# Patient Record
Sex: MALE | Race: WHITE | NOT HISPANIC OR LATINO | URBAN - METROPOLITAN AREA
[De-identification: names, ages, dates, MRNs, and addresses within clinical notes are randomized per-mention and may not be internally consistent; named-entity substitution may affect disease eponyms.]

---

## 2017-04-20 ENCOUNTER — OFFICE (AMBULATORY)
Dept: URBAN - METROPOLITAN AREA CLINIC 64 | Facility: CLINIC | Age: 57
End: 2017-04-20

## 2017-04-20 ENCOUNTER — ON CAMPUS - OUTPATIENT (AMBULATORY)
Dept: URBAN - METROPOLITAN AREA HOSPITAL 2 | Facility: HOSPITAL | Age: 57
End: 2017-04-20
Payer: COMMERCIAL

## 2017-04-20 VITALS
SYSTOLIC BLOOD PRESSURE: 137 MMHG | SYSTOLIC BLOOD PRESSURE: 138 MMHG | DIASTOLIC BLOOD PRESSURE: 101 MMHG | DIASTOLIC BLOOD PRESSURE: 95 MMHG | SYSTOLIC BLOOD PRESSURE: 150 MMHG | SYSTOLIC BLOOD PRESSURE: 162 MMHG | OXYGEN SATURATION: 97 % | DIASTOLIC BLOOD PRESSURE: 83 MMHG | RESPIRATION RATE: 18 BRPM | DIASTOLIC BLOOD PRESSURE: 89 MMHG | HEIGHT: 71 IN | DIASTOLIC BLOOD PRESSURE: 67 MMHG | HEART RATE: 74 BPM | HEART RATE: 80 BPM | OXYGEN SATURATION: 100 % | HEART RATE: 84 BPM | DIASTOLIC BLOOD PRESSURE: 107 MMHG | OXYGEN SATURATION: 96 % | WEIGHT: 190 LBS | HEART RATE: 69 BPM | HEART RATE: 73 BPM | SYSTOLIC BLOOD PRESSURE: 136 MMHG | OXYGEN SATURATION: 98 % | SYSTOLIC BLOOD PRESSURE: 142 MMHG | SYSTOLIC BLOOD PRESSURE: 134 MMHG | DIASTOLIC BLOOD PRESSURE: 78 MMHG | TEMPERATURE: 97.7 F | OXYGEN SATURATION: 99 % | DIASTOLIC BLOOD PRESSURE: 92 MMHG | RESPIRATION RATE: 16 BRPM

## 2017-04-20 DIAGNOSIS — K64.0 FIRST DEGREE HEMORRHOIDS: ICD-10-CM

## 2017-04-20 DIAGNOSIS — Z86.010 PERSONAL HISTORY OF COLONIC POLYPS: ICD-10-CM

## 2017-04-20 DIAGNOSIS — D12.0 BENIGN NEOPLASM OF CECUM: ICD-10-CM

## 2017-04-20 DIAGNOSIS — D12.2 BENIGN NEOPLASM OF ASCENDING COLON: ICD-10-CM

## 2017-04-20 DIAGNOSIS — K64.4 RESIDUAL HEMORRHOIDAL SKIN TAGS: ICD-10-CM

## 2017-04-20 LAB
GI HISTOLOGY: A. SELECT: (no result)
GI HISTOLOGY: PDF REPORT: (no result)

## 2017-04-20 PROCEDURE — 88305 TISSUE EXAM BY PATHOLOGIST: CPT | Performed by: INTERNAL MEDICINE

## 2017-04-20 PROCEDURE — 45385 COLONOSCOPY W/LESION REMOVAL: CPT | Mod: 33 | Performed by: INTERNAL MEDICINE

## 2017-04-20 RX ADMIN — PROPOFOL: 10 INJECTION, EMULSION INTRAVENOUS at 08:14

## 2019-04-14 ENCOUNTER — HOSPITAL ENCOUNTER (INPATIENT)
Facility: HOSPITAL | Age: 59
LOS: 3 days | Discharge: HOME | End: 2019-04-17
Attending: PSYCHIATRY & NEUROLOGY | Admitting: INTERNAL MEDICINE

## 2019-04-14 ENCOUNTER — APPOINTMENT (OUTPATIENT)
Dept: CT IMAGING | Facility: HOSPITAL | Age: 59
End: 2019-04-14

## 2019-04-14 DIAGNOSIS — G47.33 OBSTRUCTIVE SLEEP APNEA SYNDROME: Primary | ICD-10-CM

## 2019-04-14 LAB
APTT PPP: 29 SECONDS (ref 22.7–35.4)
BASOPHILS # BLD AUTO: 0.04 10*3/MM3 (ref 0–0.2)
BASOPHILS NFR BLD AUTO: 0.5 % (ref 0–1.5)
DEPRECATED RDW RBC AUTO: 42.5 FL (ref 37–54)
EOSINOPHIL # BLD AUTO: 0.01 10*3/MM3 (ref 0–0.4)
EOSINOPHIL NFR BLD AUTO: 0.1 % (ref 0.3–6.2)
ERYTHROCYTE [DISTWIDTH] IN BLOOD BY AUTOMATED COUNT: 12.4 % (ref 12.3–15.4)
GLUCOSE BLDC GLUCOMTR-MCNC: 103 MG/DL (ref 70–130)
GLUCOSE BLDC GLUCOMTR-MCNC: 97 MG/DL (ref 70–130)
HCT VFR BLD AUTO: 42.8 % (ref 37.5–51)
HGB BLD-MCNC: 14.7 G/DL (ref 13–17.7)
IMM GRANULOCYTES # BLD AUTO: 0.04 10*3/MM3 (ref 0–0.05)
IMM GRANULOCYTES NFR BLD AUTO: 0.5 % (ref 0–0.5)
INR PPP: 1.12 (ref 0.9–1.1)
LYMPHOCYTES # BLD AUTO: 1.12 10*3/MM3 (ref 0.7–3.1)
LYMPHOCYTES NFR BLD AUTO: 15.1 % (ref 19.6–45.3)
MCH RBC QN AUTO: 32 PG (ref 26.6–33)
MCHC RBC AUTO-ENTMCNC: 34.3 G/DL (ref 31.5–35.7)
MCV RBC AUTO: 93.2 FL (ref 79–97)
MONOCYTES # BLD AUTO: 0.35 10*3/MM3 (ref 0.1–0.9)
MONOCYTES NFR BLD AUTO: 4.7 % (ref 5–12)
NEUTROPHILS # BLD AUTO: 5.86 10*3/MM3 (ref 1.4–7)
NEUTROPHILS NFR BLD AUTO: 79.1 % (ref 42.7–76)
NRBC BLD AUTO-RTO: 0 /100 WBC (ref 0–0)
PLATELET # BLD AUTO: 251 10*3/MM3 (ref 140–450)
PMV BLD AUTO: 9.6 FL (ref 6–12)
PROTHROMBIN TIME: 14.1 SECONDS (ref 11.7–14.2)
RBC # BLD AUTO: 4.59 10*6/MM3 (ref 4.14–5.8)
WBC NRBC COR # BLD: 7.42 10*3/MM3 (ref 3.4–10.8)

## 2019-04-14 PROCEDURE — 82962 GLUCOSE BLOOD TEST: CPT

## 2019-04-14 PROCEDURE — 70498 CT ANGIOGRAPHY NECK: CPT

## 2019-04-14 PROCEDURE — 85610 PROTHROMBIN TIME: CPT | Performed by: PSYCHIATRY & NEUROLOGY

## 2019-04-14 PROCEDURE — 85730 THROMBOPLASTIN TIME PARTIAL: CPT | Performed by: PSYCHIATRY & NEUROLOGY

## 2019-04-14 PROCEDURE — 0 IOPAMIDOL PER 1 ML: Performed by: PSYCHIATRY & NEUROLOGY

## 2019-04-14 PROCEDURE — 85025 COMPLETE CBC W/AUTO DIFF WBC: CPT | Performed by: PSYCHIATRY & NEUROLOGY

## 2019-04-14 PROCEDURE — 25010000002 HEPARIN (PORCINE) PER 1000 UNITS: Performed by: PSYCHIATRY & NEUROLOGY

## 2019-04-14 PROCEDURE — 0042T HC CT CEREBRAL PERFUSION W/WO CONTRAST: CPT

## 2019-04-14 PROCEDURE — 70496 CT ANGIOGRAPHY HEAD: CPT

## 2019-04-14 PROCEDURE — 70450 CT HEAD/BRAIN W/O DYE: CPT

## 2019-04-14 PROCEDURE — 99255 IP/OBS CONSLTJ NEW/EST HI 80: CPT | Performed by: PSYCHIATRY & NEUROLOGY

## 2019-04-14 RX ORDER — SODIUM CHLORIDE 0.9 % (FLUSH) 0.9 %
3 SYRINGE (ML) INJECTION EVERY 12 HOURS SCHEDULED
Status: DISCONTINUED | OUTPATIENT
Start: 2019-04-14 | End: 2019-04-17 | Stop reason: HOSPADM

## 2019-04-14 RX ORDER — ASPIRIN 325 MG
325 TABLET ORAL DAILY
Status: DISCONTINUED | OUTPATIENT
Start: 2019-04-15 | End: 2019-04-14

## 2019-04-14 RX ORDER — ASPIRIN 300 MG/1
300 SUPPOSITORY RECTAL DAILY
Status: DISCONTINUED | OUTPATIENT
Start: 2019-04-15 | End: 2019-04-14

## 2019-04-14 RX ORDER — SODIUM CHLORIDE 9 MG/ML
75 INJECTION, SOLUTION INTRAVENOUS CONTINUOUS
Status: DISCONTINUED | OUTPATIENT
Start: 2019-04-14 | End: 2019-04-17

## 2019-04-14 RX ORDER — ASPIRIN 81 MG/1
81 TABLET, CHEWABLE ORAL DAILY
Status: DISCONTINUED | OUTPATIENT
Start: 2019-04-15 | End: 2019-04-17 | Stop reason: HOSPADM

## 2019-04-14 RX ORDER — ATORVASTATIN CALCIUM 80 MG/1
80 TABLET, FILM COATED ORAL NIGHTLY
Status: DISCONTINUED | OUTPATIENT
Start: 2019-04-14 | End: 2019-04-17 | Stop reason: HOSPADM

## 2019-04-14 RX ORDER — HEPARIN SODIUM 10000 [USP'U]/100ML
12 INJECTION, SOLUTION INTRAVENOUS
Status: DISCONTINUED | OUTPATIENT
Start: 2019-04-14 | End: 2019-04-17

## 2019-04-14 RX ORDER — ASPIRIN 300 MG/1
300 SUPPOSITORY RECTAL DAILY
Status: DISCONTINUED | OUTPATIENT
Start: 2019-04-15 | End: 2019-04-17 | Stop reason: HOSPADM

## 2019-04-14 RX ORDER — SODIUM CHLORIDE 0.9 % (FLUSH) 0.9 %
3-10 SYRINGE (ML) INJECTION AS NEEDED
Status: DISCONTINUED | OUTPATIENT
Start: 2019-04-14 | End: 2019-04-17 | Stop reason: HOSPADM

## 2019-04-14 RX ADMIN — IOPAMIDOL 150 ML: 755 INJECTION, SOLUTION INTRAVENOUS at 17:45

## 2019-04-14 RX ADMIN — SODIUM CHLORIDE 100 ML/HR: 9 INJECTION, SOLUTION INTRAVENOUS at 19:15

## 2019-04-14 RX ADMIN — SODIUM CHLORIDE, PRESERVATIVE FREE 3 ML: 5 INJECTION INTRAVENOUS at 21:05

## 2019-04-14 RX ADMIN — SODIUM CHLORIDE 100 ML/HR: 9 INJECTION, SOLUTION INTRAVENOUS at 19:36

## 2019-04-14 RX ADMIN — HEPARIN SODIUM 12 UNITS/KG/HR: 10000 INJECTION, SOLUTION INTRAVENOUS at 21:00

## 2019-04-14 NOTE — PLAN OF CARE
Problem: Patient Care Overview  Goal: Plan of Care Review  Outcome: Ongoing (interventions implemented as appropriate)   04/14/19 1936   Coping/Psychosocial   Plan of Care Reviewed With patient   OTHER   Outcome Summary Pt arrived from [place] after receiving tPA. On arrival [facility] 6. CTA completed. VSS will continue to monitor.

## 2019-04-14 NOTE — CONSULTS
"               Referring Provider: Dr. Bagley  Reason for Consultation: ICU care post TPA    Patient Care Team:  Destini Castillo APRN as PCP - General (Nurse Practitioner)    Chief complaint:   Left eye blurry vision.  Transferred from [place] after TPA for suspected stroke.    History of present illness:    Subjective    Source of information: Patient, wife, medical records.     This is a 59 year old male patient, retired with hx of HTN and dyslipidemia.   He was working on his yard and was bending over to  tree branches when he suddenly felt \"fog in his left eye\". This was followed by weakness in his legs and was stumbling around when he walked back to his house. He went to the ED at [place]. Initial CT brain was negative. It was decided with treat him with TPA which was administered at 1600. Following TPA, he developed headache, left frontal, moderate in intensity and some numbness in his right arm in addition to difficulty finding words. This symptoms persisted when he was transferred to our facility.     Concerning his HTN, he stated that he takes his medications regularly but could not recall the name of the blood pressure medication.  He has some difficulties finding words.  He said that his blood pressure is usually normal when he sees his primary care physician.  He does not check at home.    I reviewed the labs from [place]. BMP was normal.     CT brain and CT perfusion images where done on arrival to [facility] and were consistent with left ICA dissection.       Review of Systems  Constitutional: No fever or chills. No change in appetite.   ENMT: No sinus congestion or post nasal drip. Loud snoring. He sleeps with his head elevated.   Cardiovascular: No chest pain, palpitation or legs swelling.    Respiratory: No dyspnea, cough or wheezing.  Gastrointestinal: No constipation, diarrhea or abdominal pain   Neurology: Headache. Right arm weakness and numbness. No dizziness.   Musculoskeletal: No joints " pain, stiffness or swelling.   Psychiatry: No depression.  Genitourinary: No dysuria or frequent urination  Endo: No weight changes. No cold or warm intolerance.  Lymphatic: No swollen glands.  Integumentary: No rash.    History  PMH:  -Hypertension  -Dyslipidemia    PSH: None.  Medications: Blood pressure medications and statin.  Does not recall the names    Allergies: NKDA.    Family history: Father had open heart surgery and grandfather  from a heart attack.  Otherwise, negative for stroke.    Objective     Vital Signs   Temp:  [97.9 °F (36.6 °C)] 97.9 °F (36.6 °C)  Heart Rate:  [] 100  BP: (168-174)/(88-94) 174/94    Physical Exam:  Constitutional: Not in acute distress.  Eyes: Injected conjunctiva, EOMI.  ENMT: [place] 3. Mallampati 3. No oral thrush. Tonsils grade  Neck: Large. Trachea midline. No thyromegaly  Heart: RRR, no murmur  Lungs: Good and equal air entry bilaterally.  Non labored breathing.   Abdomen:Soft. No tenderness or dullness. No HSM.  Extremities: No cyanosis, clubbing or pitting edema: . Moves all extremities.  Neuro: Conscious, alert, oriented x3.  Expressive aphasia.  Loss of visual field on the right.  Right arm 4+/5 otherwise 5/5.  Decreased sensitivity on the right arm.  Psych: Appropriate mood and affect.    Integumentary: No rash  Lymphatic: No palpable cervical or supraclavicular lymph nodes.      Diagnostic imaging:  I personally and independently reviewed the following images:   CT brain without contrast 19: No acute changes        Assessment     1. Acute left MCA stroke  2. left internal carotid artery dissection  3. Right homonymous hemianopia, 2ary to #1  4. Right arm weakness, 2ary to #1  5. Headache  6. HTN, currently uncontrolled  7. Dyslipidemia  8. Snoring with physical features suggestive of REJI    Recommendations:  · Admit to ICU  · Neuro check per protocol.   · PT OT, speech eval.   · Blood pressure monitoring. Allow permissive HTN. But avoid BP>180 due to  recent TPA and dissection.   · Neuro following. Started on Heparin.  · Aspirin and Lipitor  · Recommend outpatient sleep eval for suspected REJI          I discussed the patients findings and my recommendations with patient, family and nursing staff    Sophy Guadalupe MD  04/14/19  7:18 PM

## 2019-04-14 NOTE — CONSULTS
Neurology Consult Note    Consult Date: 4/15/2019    Referring MD: Sophy Guadalupe    Reason for Consult I have been asked to see the patient in neurological consultation to render advice and opinion regarding acute stroke    Neeraj Frazier is a 59 y.o. male who presented to [facility] this afternoon with left eye vision loss, speech difficulty, and right sided weakness.     LKN  1430.  He was working in his yard after having used his tractor and noticed blurred vision of the left eye.  If he closed his left eye his vision was normal.  He walked back to his house and noticed that he was mildly ataxic dragging the right leg.  His wife took him to the emergency department where he was given TPA.  Shortly after TPA he developed a headache then severe aphasia and flaccid right hemiplegia.  He was transferred here and by then his symptoms have improved somewhat, he had some degree of aphasia but was moving the right side but with poor coordination.  CT showed a left distal ICA dissection and CT perfusion was negative.  He was started on heparin drip 3 hours after TPA was completed and overnight his headache improved and his aphasia gradually resolved.  He still has a little bit of mild right hand clumsiness but is otherwise back to his neurologic baseline.    Past Medical/Surgical Hx:  Essential hypertension  Mixed hyperlipidemia    Medications On Admission  No medications prior to admission.       Allergies:  No Known Allergies    Social Hx:  Social History     Socioeconomic History   • Marital status:      Spouse name: Not on file   • Number of children: Not on file   • Years of education: Not on file   • Highest education level: Not on file   Tobacco Use   • Smoking status: Never Smoker   • Smokeless tobacco: Never Used   Substance and Sexual Activity   • Alcohol use: Yes     Alcohol/week: 2.4 oz     Types: 4 Cans of beer per week     Frequency: 4 or more times a week     Drinks per session: 3 or 4      "Binge frequency: Weekly   • Drug use: No   • Sexual activity: Defer       Family Hx:  No family history of early CAD or stroke    Review of Systems  Constitutional: [No fevers, chills]  Eye: [+   recent visual problems, no eye discharge]  HEENT: [No ear pain, nasal congestion]  Respiratory: [No shortness of breath, cough]  Cardiovascular: [No Chest pain, palpitations]  Gastrointestinal: [No nausea, vomiting]  Genitourinary: [No hematuria, incontinence]  Hema/Lymph: [no nosebleeds, history of anticoagulation]  Endocrine: [Negative for excessive urination, heat or cold intolerance]  Musculoskeletal: [No back pain, neck pain]  Integumentary: [No rash, pruritus]  Neurologic: [No weakness, numbness]  Psychiatric: [No anxiety, depression]    Exam    /88   Pulse 71   Temp 98.7 °F (37.1 °C) (Oral)   Resp 16   Ht 182.9 cm (72\")   Wt 80.4 kg (177 lb 4 oz)   SpO2 97%   BMI 24.04 kg/m²   gen: NAD, vitals reviewed  Eyes: fundus sharp with no papilledema or retinal hemorrhages  HEENT: no nuchal rigidity  CVS: RRR, S1, S2  MS: oriented x3, recent/remote memory intact, normal attention/concentration, language intact, no neglect, normal fund of knowledge  CN: visual acuity grossly normal, visual fields full, PERRL, EOMI, facial sensation equal, no facial droop, hearing symmetric, palate elevates symmetrically, shoulder shrug equal, tongue midline  Motor: Right upper extremity pronator drift, otherwise 5/5 throughout upper and lower extremities, normal tone  Sensation: intact to vibration and temperature throughout  Reflexes: 2+ throughout upper and lower extremities, downgoing plantars  Coordination: Decreased coordination right upper extremity, no dysmetria left upper extremity.  Gait: no ataxia    DATA:    No results found for: GLUCOSE, CALCIUM, NA, K, CO2, CL, BUN, CREATININE, EGFRIFAFRI, EGFRIFNONA, BCR, ANIONGAP  Lab Results   Component Value Date    WBC 7.53 04/15/2019    HGB 13.9 04/15/2019    HCT 40.8 04/15/2019 "    MCV 94.0 04/15/2019     04/15/2019     Lab Results   Component Value Date    CHOL 162 04/15/2019     Lab Results   Component Value Date    HDL 66 (H) 04/15/2019     Lab Results   Component Value Date    LDL 80 04/15/2019     Lab Results   Component Value Date    TRIG 82 04/15/2019     Lab Results   Component Value Date    HGBA1C 5.30 04/15/2019     Lab Results   Component Value Date    INR 1.12 (H) 04/14/2019    PROTIME 14.1 04/14/2019       Lab review: LDL 80, hemoglobin A1c normal    Imaging review: CTH, CTA H/N, CTP reviewed and discussed with the reading radiologist. CTH is negative for stroke or ICH, CTA shows left distal cervical dissection with thrombus, CTP negative.    Impression:  1) Acute stroke, left middle cerebral artery, atheroembolic from left ICA thrombus  2) Left carotid dissection  3) Thrombus    Comment: 5-year-old man, previously healthy except for hypertension hyperlipidemia, developed left ICA dissection yesterday with resultant thrombus.  Seems he had an embolic event from that thrombus which led him to present to [place], following TPA he had a second embolic event which resulted in aphasia and severe weakness.  He was started on heparin drip several hours after TPA and is done well overnight.  Plan is to continue heparin to continue to stabilize his thrombus.  Will get MRI today to clarify the extent of his stroke.    PLAN:  1. Heparin gtt  2. Swallow eval  3. MRI brain today in place of 24H CT  4. ASA, statin, after first dose of ASA today will continue heparin gtt alone with plan of transitioning to warfarin until his thrombus resolves.

## 2019-04-15 ENCOUNTER — APPOINTMENT (OUTPATIENT)
Dept: CT IMAGING | Facility: HOSPITAL | Age: 59
End: 2019-04-15

## 2019-04-15 ENCOUNTER — APPOINTMENT (OUTPATIENT)
Dept: MRI IMAGING | Facility: HOSPITAL | Age: 59
End: 2019-04-15

## 2019-04-15 ENCOUNTER — APPOINTMENT (OUTPATIENT)
Dept: CARDIOLOGY | Facility: HOSPITAL | Age: 59
End: 2019-04-15

## 2019-04-15 PROBLEM — I63.9 CVA (CEREBRAL VASCULAR ACCIDENT) (HCC): Status: ACTIVE | Noted: 2019-04-15

## 2019-04-15 LAB
AORTIC DIMENSIONLESS INDEX: 0.7 (DI)
APTT PPP: 34 SECONDS (ref 22.7–35.4)
APTT PPP: 49.9 SECONDS (ref 22.7–35.4)
APTT PPP: 50.1 SECONDS (ref 22.7–35.4)
BASOPHILS # BLD AUTO: 0.04 10*3/MM3 (ref 0–0.2)
BASOPHILS NFR BLD AUTO: 0.5 % (ref 0–1.5)
BH CV ECHO MEAS - ACS: 2 CM
BH CV ECHO MEAS - AO MAX PG (FULL): 5.9 MMHG
BH CV ECHO MEAS - AO MAX PG: 12.4 MMHG
BH CV ECHO MEAS - AO MEAN PG (FULL): 4 MMHG
BH CV ECHO MEAS - AO MEAN PG: 7 MMHG
BH CV ECHO MEAS - AO ROOT AREA (BSA CORRECTED): 1.5
BH CV ECHO MEAS - AO ROOT AREA: 7.1 CM^2
BH CV ECHO MEAS - AO ROOT DIAM: 3 CM
BH CV ECHO MEAS - AO V2 MAX: 176 CM/SEC
BH CV ECHO MEAS - AO V2 MEAN: 121 CM/SEC
BH CV ECHO MEAS - AO V2 VTI: 33.8 CM
BH CV ECHO MEAS - AVA(I,A): 2.3 CM^2
BH CV ECHO MEAS - AVA(I,D): 2.3 CM^2
BH CV ECHO MEAS - AVA(V,A): 2.5 CM^2
BH CV ECHO MEAS - AVA(V,D): 2.5 CM^2
BH CV ECHO MEAS - BSA(HAYCOCK): 2 M^2
BH CV ECHO MEAS - BSA: 2 M^2
BH CV ECHO MEAS - BZI_BMI: 24.2 KILOGRAMS/M^2
BH CV ECHO MEAS - BZI_METRIC_HEIGHT: 182 CM
BH CV ECHO MEAS - BZI_METRIC_WEIGHT: 80 KG
BH CV ECHO MEAS - EDV(CUBED): 103.8 ML
BH CV ECHO MEAS - EDV(MOD-SP2): 80 ML
BH CV ECHO MEAS - EDV(MOD-SP4): 83 ML
BH CV ECHO MEAS - EDV(TEICH): 102.4 ML
BH CV ECHO MEAS - EF(CUBED): 65.4 %
BH CV ECHO MEAS - EF(MOD-BP): 63 %
BH CV ECHO MEAS - EF(MOD-SP2): 62.5 %
BH CV ECHO MEAS - EF(MOD-SP4): 63.9 %
BH CV ECHO MEAS - EF(TEICH): 56.9 %
BH CV ECHO MEAS - ESV(CUBED): 35.9 ML
BH CV ECHO MEAS - ESV(MOD-SP2): 30 ML
BH CV ECHO MEAS - ESV(MOD-SP4): 30 ML
BH CV ECHO MEAS - ESV(TEICH): 44.1 ML
BH CV ECHO MEAS - FS: 29.8 %
BH CV ECHO MEAS - IVS/LVPW: 1.2
BH CV ECHO MEAS - IVSD: 1.2 CM
BH CV ECHO MEAS - LAT PEAK E' VEL: 12.1 CM/SEC
BH CV ECHO MEAS - LV DIASTOLIC VOL/BSA (35-75): 41.2 ML/M^2
BH CV ECHO MEAS - LV MASS(C)D: 187.5 GRAMS
BH CV ECHO MEAS - LV MASS(C)DI: 93.2 GRAMS/M^2
BH CV ECHO MEAS - LV MAX PG: 6.5 MMHG
BH CV ECHO MEAS - LV MEAN PG: 3 MMHG
BH CV ECHO MEAS - LV SYSTOLIC VOL/BSA (12-30): 14.9 ML/M^2
BH CV ECHO MEAS - LV V1 MAX: 127 CM/SEC
BH CV ECHO MEAS - LV V1 MEAN: 79.1 CM/SEC
BH CV ECHO MEAS - LV V1 VTI: 22.7 CM
BH CV ECHO MEAS - LVIDD: 4.7 CM
BH CV ECHO MEAS - LVIDS: 3.3 CM
BH CV ECHO MEAS - LVLD AP2: 8.1 CM
BH CV ECHO MEAS - LVLD AP4: 8.3 CM
BH CV ECHO MEAS - LVLS AP2: 5.6 CM
BH CV ECHO MEAS - LVLS AP4: 5.9 CM
BH CV ECHO MEAS - LVOT AREA (M): 3.5 CM^2
BH CV ECHO MEAS - LVOT AREA: 3.5 CM^2
BH CV ECHO MEAS - LVOT DIAM: 2.1 CM
BH CV ECHO MEAS - LVPWD: 1 CM
BH CV ECHO MEAS - MED PEAK E' VEL: 11.9 CM/SEC
BH CV ECHO MEAS - MV A DUR: 127 SEC
BH CV ECHO MEAS - MV A MAX VEL: 72.3 CM/SEC
BH CV ECHO MEAS - MV DEC SLOPE: 360 CM/SEC^2
BH CV ECHO MEAS - MV DEC TIME: 232 SEC
BH CV ECHO MEAS - MV E MAX VEL: 87.3 CM/SEC
BH CV ECHO MEAS - MV E/A: 1.2
BH CV ECHO MEAS - MV MAX PG: 4.8 MMHG
BH CV ECHO MEAS - MV MEAN PG: 2 MMHG
BH CV ECHO MEAS - MV P1/2T MAX VEL: 117 CM/SEC
BH CV ECHO MEAS - MV P1/2T: 95.2 MSEC
BH CV ECHO MEAS - MV V2 MAX: 110 CM/SEC
BH CV ECHO MEAS - MV V2 MEAN: 64.5 CM/SEC
BH CV ECHO MEAS - MV V2 VTI: 31.5 CM
BH CV ECHO MEAS - MVA P1/2T LCG: 1.9 CM^2
BH CV ECHO MEAS - MVA(P1/2T): 2.3 CM^2
BH CV ECHO MEAS - MVA(VTI): 2.5 CM^2
BH CV ECHO MEAS - PA ACC TIME: 0.13 SEC
BH CV ECHO MEAS - PA PR(ACCEL): 21.9 MMHG
BH CV ECHO MEAS - PULM A REVS DUR: 0.12 SEC
BH CV ECHO MEAS - PULM A REVS VEL: 39.3 CM/SEC
BH CV ECHO MEAS - PULM DIAS VEL: 60.6 CM/SEC
BH CV ECHO MEAS - PULM S/D: 1.2
BH CV ECHO MEAS - PULM SYS VEL: 73.2 CM/SEC
BH CV ECHO MEAS - QP/QS: 0.62
BH CV ECHO MEAS - RV MAX PG: 2.3 MMHG
BH CV ECHO MEAS - RV MEAN PG: 1 MMHG
BH CV ECHO MEAS - RV V1 MAX: 76.1 CM/SEC
BH CV ECHO MEAS - RV V1 MEAN: 57.5 CM/SEC
BH CV ECHO MEAS - RV V1 VTI: 15.4 CM
BH CV ECHO MEAS - RVOT AREA: 3.1 CM^2
BH CV ECHO MEAS - RVOT DIAM: 2 CM
BH CV ECHO MEAS - SI(AO): 118.7 ML/M^2
BH CV ECHO MEAS - SI(CUBED): 33.7 ML/M^2
BH CV ECHO MEAS - SI(LVOT): 39.1 ML/M^2
BH CV ECHO MEAS - SI(MOD-SP2): 24.8 ML/M^2
BH CV ECHO MEAS - SI(MOD-SP4): 26.3 ML/M^2
BH CV ECHO MEAS - SI(TEICH): 28.9 ML/M^2
BH CV ECHO MEAS - SV(AO): 238.9 ML
BH CV ECHO MEAS - SV(CUBED): 67.9 ML
BH CV ECHO MEAS - SV(LVOT): 78.6 ML
BH CV ECHO MEAS - SV(MOD-SP2): 50 ML
BH CV ECHO MEAS - SV(MOD-SP4): 53 ML
BH CV ECHO MEAS - SV(RVOT): 48.4 ML
BH CV ECHO MEAS - SV(TEICH): 58.2 ML
BH CV ECHO MEAS - TAPSE (>1.6): 3.8 CM2
BH CV ECHO MEASUREMENTS AVERAGE E/E' RATIO: 7.28
BH CV XLRA - RV BASE: 3.8 CM
BH CV XLRA - TDI S': 16.2 CM/SEC
CHOLEST SERPL-MCNC: 162 MG/DL (ref 0–200)
DEPRECATED RDW RBC AUTO: 43.6 FL (ref 37–54)
EOSINOPHIL # BLD AUTO: 0.03 10*3/MM3 (ref 0–0.4)
EOSINOPHIL NFR BLD AUTO: 0.4 % (ref 0.3–6.2)
ERYTHROCYTE [DISTWIDTH] IN BLOOD BY AUTOMATED COUNT: 12.6 % (ref 12.3–15.4)
GLUCOSE BLDC GLUCOMTR-MCNC: 107 MG/DL (ref 70–130)
GLUCOSE BLDC GLUCOMTR-MCNC: 115 MG/DL (ref 70–130)
HBA1C MFR BLD: 5.3 % (ref 4.8–5.6)
HCT VFR BLD AUTO: 40.8 % (ref 37.5–51)
HDLC SERPL-MCNC: 66 MG/DL (ref 40–60)
HGB BLD-MCNC: 13.9 G/DL (ref 13–17.7)
IMM GRANULOCYTES # BLD AUTO: 0.01 10*3/MM3 (ref 0–0.05)
IMM GRANULOCYTES NFR BLD AUTO: 0.1 % (ref 0–0.5)
LDLC SERPL CALC-MCNC: 80 MG/DL (ref 0–100)
LDLC/HDLC SERPL: 1.21 {RATIO}
LEFT ATRIUM VOLUME INDEX: 31 ML/M2
LV EF 2D ECHO EST: 63 %
LYMPHOCYTES # BLD AUTO: 2.22 10*3/MM3 (ref 0.7–3.1)
LYMPHOCYTES NFR BLD AUTO: 29.5 % (ref 19.6–45.3)
MAXIMAL PREDICTED HEART RATE: 160 BPM
MCH RBC QN AUTO: 32 PG (ref 26.6–33)
MCHC RBC AUTO-ENTMCNC: 34.1 G/DL (ref 31.5–35.7)
MCV RBC AUTO: 94 FL (ref 79–97)
MONOCYTES # BLD AUTO: 0.55 10*3/MM3 (ref 0.1–0.9)
MONOCYTES NFR BLD AUTO: 7.3 % (ref 5–12)
NEUTROPHILS # BLD AUTO: 4.68 10*3/MM3 (ref 1.4–7)
NEUTROPHILS NFR BLD AUTO: 62.2 % (ref 42.7–76)
NRBC BLD AUTO-RTO: 0 /100 WBC (ref 0–0)
PLATELET # BLD AUTO: 223 10*3/MM3 (ref 140–450)
PMV BLD AUTO: 9.8 FL (ref 6–12)
RBC # BLD AUTO: 4.34 10*6/MM3 (ref 4.14–5.8)
STRESS TARGET HR: 136 BPM
TRIGL SERPL-MCNC: 82 MG/DL (ref 0–150)
VLDLC SERPL-MCNC: 16.4 MG/DL (ref 5–40)
WBC NRBC COR # BLD: 7.53 10*3/MM3 (ref 3.4–10.8)

## 2019-04-15 PROCEDURE — 82962 GLUCOSE BLOOD TEST: CPT

## 2019-04-15 PROCEDURE — 85730 THROMBOPLASTIN TIME PARTIAL: CPT | Performed by: INTERNAL MEDICINE

## 2019-04-15 PROCEDURE — 97166 OT EVAL MOD COMPLEX 45 MIN: CPT

## 2019-04-15 PROCEDURE — 85730 THROMBOPLASTIN TIME PARTIAL: CPT | Performed by: PSYCHIATRY & NEUROLOGY

## 2019-04-15 PROCEDURE — 93306 TTE W/DOPPLER COMPLETE: CPT

## 2019-04-15 PROCEDURE — 83036 HEMOGLOBIN GLYCOSYLATED A1C: CPT | Performed by: PSYCHIATRY & NEUROLOGY

## 2019-04-15 PROCEDURE — 93306 TTE W/DOPPLER COMPLETE: CPT | Performed by: INTERNAL MEDICINE

## 2019-04-15 PROCEDURE — 97535 SELF CARE MNGMENT TRAINING: CPT

## 2019-04-15 PROCEDURE — 85025 COMPLETE CBC W/AUTO DIFF WBC: CPT | Performed by: PSYCHIATRY & NEUROLOGY

## 2019-04-15 PROCEDURE — 80061 LIPID PANEL: CPT | Performed by: PSYCHIATRY & NEUROLOGY

## 2019-04-15 PROCEDURE — 25010000002 HEPARIN (PORCINE) PER 1000 UNITS: Performed by: PSYCHIATRY & NEUROLOGY

## 2019-04-15 PROCEDURE — 70551 MRI BRAIN STEM W/O DYE: CPT

## 2019-04-15 PROCEDURE — 97161 PT EVAL LOW COMPLEX 20 MIN: CPT

## 2019-04-15 PROCEDURE — 92610 EVALUATE SWALLOWING FUNCTION: CPT

## 2019-04-15 RX ORDER — LORAZEPAM 2 MG/ML
2 INJECTION INTRAMUSCULAR
Status: DISCONTINUED | OUTPATIENT
Start: 2019-04-15 | End: 2019-04-17 | Stop reason: HOSPADM

## 2019-04-15 RX ORDER — ONDANSETRON 2 MG/ML
4 INJECTION INTRAMUSCULAR; INTRAVENOUS EVERY 6 HOURS PRN
Status: DISCONTINUED | OUTPATIENT
Start: 2019-04-15 | End: 2019-04-17 | Stop reason: HOSPADM

## 2019-04-15 RX ORDER — LORAZEPAM 2 MG/ML
1 INJECTION INTRAMUSCULAR
Status: DISCONTINUED | OUTPATIENT
Start: 2019-04-15 | End: 2019-04-17 | Stop reason: HOSPADM

## 2019-04-15 RX ORDER — ACETAMINOPHEN 325 MG/1
650 TABLET ORAL EVERY 4 HOURS PRN
Status: DISCONTINUED | OUTPATIENT
Start: 2019-04-15 | End: 2019-04-17 | Stop reason: HOSPADM

## 2019-04-15 RX ORDER — THIAMINE MONONITRATE (VIT B1) 100 MG
100 TABLET ORAL DAILY
Status: DISCONTINUED | OUTPATIENT
Start: 2019-04-16 | End: 2019-04-17 | Stop reason: HOSPADM

## 2019-04-15 RX ORDER — LORAZEPAM 2 MG/ML
4 INJECTION INTRAMUSCULAR
Status: DISCONTINUED | OUTPATIENT
Start: 2019-04-15 | End: 2019-04-17 | Stop reason: HOSPADM

## 2019-04-15 RX ORDER — LORAZEPAM 1 MG/1
4 TABLET ORAL
Status: DISCONTINUED | OUTPATIENT
Start: 2019-04-15 | End: 2019-04-17 | Stop reason: HOSPADM

## 2019-04-15 RX ORDER — ONDANSETRON 4 MG/1
4 TABLET, FILM COATED ORAL EVERY 6 HOURS PRN
Status: DISCONTINUED | OUTPATIENT
Start: 2019-04-15 | End: 2019-04-17 | Stop reason: HOSPADM

## 2019-04-15 RX ORDER — FOLIC ACID 1 MG/1
1 TABLET ORAL DAILY
Status: DISCONTINUED | OUTPATIENT
Start: 2019-04-16 | End: 2019-04-17 | Stop reason: HOSPADM

## 2019-04-15 RX ORDER — LORAZEPAM 1 MG/1
2 TABLET ORAL
Status: DISCONTINUED | OUTPATIENT
Start: 2019-04-15 | End: 2019-04-17 | Stop reason: HOSPADM

## 2019-04-15 RX ORDER — THIAMINE MONONITRATE (VIT B1) 100 MG
100 TABLET ORAL ONCE
Status: COMPLETED | OUTPATIENT
Start: 2019-04-15 | End: 2019-04-15

## 2019-04-15 RX ORDER — LISINOPRIL 20 MG/1
20 TABLET ORAL DAILY
COMMUNITY

## 2019-04-15 RX ORDER — SIMVASTATIN 20 MG
20 TABLET ORAL NIGHTLY
COMMUNITY
End: 2019-04-17 | Stop reason: HOSPADM

## 2019-04-15 RX ORDER — LORAZEPAM 1 MG/1
1 TABLET ORAL
Status: DISCONTINUED | OUTPATIENT
Start: 2019-04-15 | End: 2019-04-17 | Stop reason: HOSPADM

## 2019-04-15 RX ORDER — AMLODIPINE BESYLATE 2.5 MG/1
2.5 TABLET ORAL DAILY
COMMUNITY

## 2019-04-15 RX ORDER — DIPHENOXYLATE HYDROCHLORIDE AND ATROPINE SULFATE 2.5; .025 MG/1; MG/1
1 TABLET ORAL DAILY
Status: DISCONTINUED | OUTPATIENT
Start: 2019-04-16 | End: 2019-04-17 | Stop reason: HOSPADM

## 2019-04-15 RX ADMIN — HEPARIN SODIUM 18 UNITS/KG/HR: 10000 INJECTION, SOLUTION INTRAVENOUS at 19:29

## 2019-04-15 RX ADMIN — SODIUM CHLORIDE 75 ML/HR: 9 INJECTION, SOLUTION INTRAVENOUS at 18:41

## 2019-04-15 RX ADMIN — SODIUM CHLORIDE 100 ML/HR: 9 INJECTION, SOLUTION INTRAVENOUS at 04:02

## 2019-04-15 RX ADMIN — ACETAMINOPHEN 650 MG: 325 TABLET, FILM COATED ORAL at 13:28

## 2019-04-15 RX ADMIN — ASPIRIN 81 MG: 81 TABLET, CHEWABLE ORAL at 18:42

## 2019-04-15 RX ADMIN — SODIUM CHLORIDE, PRESERVATIVE FREE 3 ML: 5 INJECTION INTRAVENOUS at 19:30

## 2019-04-15 RX ADMIN — ACETAMINOPHEN 650 MG: 325 TABLET, FILM COATED ORAL at 22:20

## 2019-04-15 RX ADMIN — ACETAMINOPHEN 650 MG: 325 TABLET, FILM COATED ORAL at 07:33

## 2019-04-15 RX ADMIN — ATORVASTATIN CALCIUM 80 MG: 80 TABLET, FILM COATED ORAL at 19:31

## 2019-04-15 RX ADMIN — SODIUM CHLORIDE, PRESERVATIVE FREE 3 ML: 5 INJECTION INTRAVENOUS at 10:53

## 2019-04-15 RX ADMIN — Medication 100 MG: at 10:53

## 2019-04-15 NOTE — PLAN OF CARE
Problem: Patient Care Overview  Goal: Interprofessional Rounds/Family Conf  Outcome: Ongoing (interventions implemented as appropriate)   04/15/19 0430   Interdisciplinary Rounds/Family Conf   Participants family;nursing;patient

## 2019-04-15 NOTE — PLAN OF CARE
Problem: Thrombolytic Therapy (Adult)  Goal: Signs and Symptoms of Listed Potential Problems Will be Absent, Minimized or Managed (Thrombolytic Therapy)   04/15/19 1807   Goal/Outcome Evaluation   Problems Assessed (Thrombolytic Therapy) all   Problems Assessed (Thrombolytic Therapy) none       Problem: Stroke (Ischemic) (Adult)  Goal: Signs and Symptoms of Listed Potential Problems Will be Absent, Minimized or Managed (Stroke)   04/15/19 1807   Goal/Outcome Evaluation   Problems Assessed (Stroke (Ischemic)) all   Problems Assessed (Stroke (Ischemic)) communication impairment;cognitive impairment;skin breakdown;situational response       Problem: Fall Risk (Adult)  Goal: Identify Related Risk Factors and Signs and Symptoms   04/15/19 1807   Fall Risk (Adult)   Related Risk Factors (Fall Risk) environment unfamiliar;slippery/uneven surfaces;neuro disease/injury;inadequate lighting;history of falls   Signs and Symptoms (Fall Risk) presence of risk factors     Goal: Absence of Fall   04/15/19 1807   Fall Risk (Adult)   Absence of Fall making progress toward outcome       Problem: Patient Care Overview  Goal: Plan of Care Review   04/15/19 1807   Coping/Psychosocial   Plan of Care Reviewed With patient;spouse   OTHER   Outcome Summary Pt neurologically intact. MRI and TTE completed. Tolerating diet. C/o headache well controlled by tylenol. Plan for out of ICu tomorrow.    Plan of Care Review   Progress improving     Goal: Individualization and Mutuality   04/15/19 1807   Individualization   Patient Specific Goals (Include Timeframe) Pt's goal is to d/c to home as soon as possible.        Problem: Skin Injury Risk (Adult)  Goal: Identify Related Risk Factors and Signs and Symptoms   04/15/19 1807   Skin Injury Risk (Adult)   Related Risk Factors (Skin Injury Risk) critical care admission     Goal: Skin Health and Integrity   04/15/19 1807   Skin Injury Risk (Adult)   Skin Health and Integrity making progress toward  outcome

## 2019-04-15 NOTE — PLAN OF CARE
Problem: Patient Care Overview  Goal: Plan of Care Review   04/15/19 1118   Coping/Psychosocial   Plan of Care Reviewed With patient;spouse   OTHER   Outcome Summary Patient improving, swallow function assessed and is within normal limits. Patient reports no speech-language impairment at this time, and none noted during this evaluation. Recommend regular diet, advised patient to notify Speech department if he notes any impairment post-discharge. Speech to sign off at this time.   Plan of Care Review   Progress improving

## 2019-04-15 NOTE — PAYOR COMM NOTE
"Kimi Frazier (59 y.o. Male)                     ATTENTION;  AUTH PENDING [id number], CLINICALS FOR NURSE REVIEW,                   REPLY TO UR DEPT, DEANGELO BALLARD [facility]  OR UR  [phone]       Date of Birth Social Security Number Address Home Phone MRN    1960  [id number]  [place] [address] [phone] [id number]    Druze Marital Status          None        Admission Date Admission Type Admitting Provider Attending Provider Department, Room/Bed    4/14/19 Urgent Kody Bagley MD Thornton, James Benjamin, MD [facility] INTENSIVE CARE, I377/1    Discharge Date Discharge Disposition Discharge Destination                       Attending Provider:  Kody Bagley MD    Allergies:  No Known Allergies    Isolation:  None   Infection:  None   Code Status:  CPR    Ht:  182.9 cm (72\")   Wt:  80.4 kg (177 lb 4 oz)    Admission Cmt:  None   Principal Problem:  None                Active Insurance as of 4/14/2019     Primary Coverage     Payor Plan Insurance Group Employer/Plan Group    [employer] PPO [id number]     Payor Plan Address Payor Plan Phone Number Payor Plan Fax Number Effective Dates    [address] [phone]  9/1/2017 - None Entered    [place] [address]       Subscriber Name Subscriber Birth Date Member ID       KIMI FRAZIER 1960 [id number]                 Emergency Contacts      (Rel.) Home Phone Work Phone Mobile Phone    GREGORY FRAZIER (Spouse) [phone] -- --            History & Physical     No notes of this type exist for this encounter.        ICU Vital Signs     Row Name 04/15/19 1000 04/15/19 0822 04/15/19 0800 04/15/19 0733 04/15/19 0700       Vitals    Temp  --  98.7 °F (37.1 °C)  --  --  --    Temp src  --  Oral  --  --  --    Pulse  75  --  71  --  68    Heart Rate Source  --  --  Monitor  --  Monitor    Resp  --  --  16  --  18    Resp Rate " Source  --  --  Visual  --  Visual    BP  140/87  --  147/88  --  140/97    Noninvasive MAP (mmHg)  --  --  --  --  121    BP Location  --  --  --  --  Right arm    BP Method  --  --  --  --  Automatic    Patient Position  --  --  --  --  Lying       Oxygen Therapy    SpO2  98 %  --  97 %  --  95 %    Pulse Oximetry Type  --  --  --  --  Continuous    Device (Oxygen Therapy)  --  --  --  room air  room air    Row Name 04/15/19 0656 04/15/19 0600 04/15/19 0500 04/15/19 0400 04/15/19 0300       Height and Weight    Weight  --  --  --  80.4 kg (177 lb 4 oz)  --    Weight Method  --  --  --  Bed scale  --       Vitals    Pulse  74  69  71  74  73    Heart Rate Source  --  Monitor  Monitor  Monitor  Monitor    Resp  18  18  18  18  18    Resp Rate Source  --  Visual  Visual  Visual  Visual    BP  140/97  157/100  150/86  146/89  150/87    Noninvasive MAP (mmHg)  --  126  116  110  107    BP Location  --  Right arm  Right arm  Right arm  Right arm    BP Method  --  Automatic  Automatic  Automatic  Automatic    Patient Position  --  Lying  Lying  Lying  Lying       Oxygen Therapy    SpO2  97 %  97 %  95 %  97 %  96 %    Pulse Oximetry Type  --  Continuous  Continuous  Continuous  Continuous    Device (Oxygen Therapy)  room air  room air  room air  room air  room air    Row Name 04/15/19 0235 04/15/19 0200 04/15/19 0135 04/15/19 0130 04/15/19 0100       Vitals    Pulse  72  76  85  85  76    Heart Rate Source  --  Monitor  --  --  Monitor    Resp  --  18  --  18  18    Resp Rate Source  --  Visual  --  --  Visual    BP  140/86  153/80  149/83  149/83  146/85    Noninvasive MAP (mmHg)  106  106  107  --  119    BP Location  --  Right arm  --  --  Right arm    BP Method  --  Automatic  --  --  Automatic    Patient Position  --  Lying  --  --  Lying       Oxygen Therapy    SpO2  95 %  96 %  96 %  95 %  95 %    Pulse Oximetry Type  --  Continuous  --  --  Continuous    Device (Oxygen Therapy)  --  room air  --  room air  room  air    Row Name 04/15/19 0035 04/15/19 0030 04/15/19 0005 04/15/19 0000 04/14/19 2335       Vitals    Temp  --  --  --  98.8 °F (37.1 °C)  --    Temp src  --  --  --  Oral  --    Pulse  92  92  79  76  77    Heart Rate Source  --  --  --  Monitor  --    Resp  --  18  --  18  --    Resp Rate Source  --  --  --  Visual  --    BP  147/86  147/86  148/80  148/80  149/87    Noninvasive MAP (mmHg)  104  --  106  106  109    BP Location  --  --  --  Right arm  --    BP Method  --  --  --  Automatic  --    Patient Position  --  --  --  Lying  --       Oxygen Therapy    SpO2  96 %  96 %  98 %  97 %  97 %    Pulse Oximetry Type  --  --  --  Continuous  --    Device (Oxygen Therapy)  --  room air  --  room air  --    Row Name 04/14/19 2330 04/14/19 2305 04/14/19 2300 04/14/19 2235 04/14/19 2230       Vitals    Pulse  77  75  79  80  80    Heart Rate Source  --  --  Monitor  --  --    Resp  18  --  18  --  18    Resp Rate Source  --  --  Visual  --  --    BP  149/87  156/83  156/83  144/89  144/89    Noninvasive MAP (mmHg)  --  112  112  111  --    BP Location  --  --  Right arm  --  --    BP Method  --  --  Automatic  --  --    Patient Position  --  --  Lying  --  --       Oxygen Therapy    SpO2  97 %  97 %  95 %  98 %  98 %    Pulse Oximetry Type  --  --  Continuous  --  --    Device (Oxygen Therapy)  room air  --  room air  --  room air    Row Name 04/14/19 2205 04/14/19 2200 04/14/19 2130 04/14/19 2105 04/14/19 2100       Vitals    Pulse  87  90  87  84  85    Heart Rate Source  --  Monitor  --  --  Monitor    Resp  --  18  18  --  18    Resp Rate Source  --  Visual  --  --  Visual    BP  157/86  157/86  149/80  134/96  134/96    Noninvasive MAP (mmHg)  133  133  --  105  105    BP Location  --  Right arm  --  --  Right arm    BP Method  --  Automatic  --  --  Automatic    Patient Position  --  Lying  --  --  Lying       Oxygen Therapy    SpO2  97 %  100 %  95 %  98 %  97 %    Pulse Oximetry Type  --  Continuous  --  --   "Continuous    Device (Oxygen Therapy)  --  room air  room air  --  room air    Row Name 04/14/19 2040 04/14/19 2030 04/14/19 2020 04/14/19 2005 04/14/19 2000       Vitals    Temp  --  --  --  --  99 °F (37.2 °C)    Temp src  --  --  --  --  Oral    Pulse  89  89  82  86  85    Resp  --  18  --  --  18    Resp Rate Source  --  --  --  --  Visual    BP  158/80  158/80  136/79  151/81  151/81    Noninvasive MAP (mmHg)  111  --  100  105  105    BP Location  --  --  --  --  Right arm    BP Method  --  --  --  --  Automatic    Patient Position  --  --  --  --  Lying       Oxygen Therapy    SpO2  98 %  98 %  98 %  97 %  98 %    Pulse Oximetry Type  --  --  --  --  Continuous    Device (Oxygen Therapy)  --  room air  --  --  room air    Row Name 04/14/19 1950 04/14/19 1945 04/14/19 1934 04/14/19 1930 04/14/19 1921       Vitals    Pulse  92  92  90  90  89    Resp  --  18  --  18  --    BP  159/87  159/87  154/86  154/86  157/83    Noninvasive MAP (mmHg)  116  --  106  --  104       Oxygen Therapy    SpO2  99 %  99 %  98 %  98 %  96 %    Device (Oxygen Therapy)  --  room air  --  room air  --    Row Name 04/14/19 1915 04/14/19 1903 04/14/19 1848 04/14/19 1839 04/14/19 1830       Vitals    Pulse  89  89  100  93  --    Resp  18  --  --  --  --    BP  157/83  156/87  174/94  169/88  168/90    Noninvasive MAP (mmHg)  --  113  114  120  118       Oxygen Therapy    SpO2  96 %  98 %  98 %  99 %  99 %    Device (Oxygen Therapy)  room air  --  --  --  --    Row Name 04/14/19 1800                   Height and Weight    Height  182.9 cm (72\")        Height Method  Estimated        Weight  81.2 kg (179 lb 0.2 oz)        Weight Method  Bed scale        Ideal Body Weight (IBW) (kg)  82.07        BSA (Calculated - sq m)  2.03 sq meters        BMI (Calculated)  24.3        Weight in (lb) to have BMI = 25  183.9           Vitals    Temp  97.9 °F (36.6 °C)        Temp src  Oral            Lines, Drains & Airways    Active LDAs     Name:   " "Placement date:   Placement time:   Site:   Days:    Peripheral IV 04/15/19 0725 Left Antecubital   04/15/19    0725    Antecubital   less than 1    Peripheral IV 04/15/19 0725 Posterior;Right Hand   04/15/19    0725    Hand   less than 1         Inactive LDAs     None                Hospital Medications (all)       Dose Frequency Start End    acetaminophen (TYLENOL) tablet 650 mg 650 mg Every 4 Hours PRN 4/15/2019     Sig - Route: Take 2 tablets by mouth Every 4 (Four) Hours As Needed for Moderate Pain . - Oral    aspirin chewable tablet 81 mg 81 mg Daily 4/15/2019     Sig - Route: Chew 1 tablet Daily. - Oral    Linked Group 1:  \"Or\" Linked Group Details        aspirin suppository 300 mg 300 mg Daily 4/15/2019     Sig - Route: Insert 1 suppository into the rectum Daily. - Rectal    Linked Group 1:  \"Or\" Linked Group Details        atorvastatin (LIPITOR) tablet 80 mg 80 mg Nightly 4/14/2019     Sig - Route: Take 1 tablet by mouth Every Night. - Oral    clevidipine (CLEVIPREX) infusion 0.5 mg/mL 2-32 mg/hr Titrated 4/14/2019     Sig - Route: Infuse 2-32 mg/hr into a venous catheter Dose Adjusted By Provider As Needed. - Intravenous    folic acid (FOLVITE) tablet 1 mg 1 mg Daily 4/16/2019 4/19/2019    Sig - Route: Take 1 tablet by mouth Daily. - Oral    Linked Group 2:  \"And\" Linked Group Details        heparin 96669 units/250 mL (100 units/mL) in 0.45 % NaCl infusion 12 Units/kg/hr × 81.2 kg Titrated 4/14/2019     Sig - Route: Infuse 974 Units/hr into a venous catheter Dose Adjusted By Provider As Needed. - Intravenous    iopamidol (ISOVUE-370) 76 % injection 150 mL 150 mL Once in Imaging 4/14/2019 4/14/2019    Sig - Route: Infuse 150 mL into a venous catheter Once. - Intravenous    LORazepam (ATIVAN) injection 1 mg 1 mg Every 2 Hours PRN 4/15/2019 4/25/2019    Sig - Route: Infuse 0.5 mL into a venous catheter Every 2 (Two) Hours As Needed for Withdrawal (For CIWA-Ar 8-10). - Intravenous    Linked Group 3:  \"Or\" " "Linked Group Details        LORazepam (ATIVAN) injection 2 mg 2 mg Every 1 Hour PRN 4/15/2019 4/25/2019    Sig - Route: Infuse 1 mL into a venous catheter Every 1 (One) Hour As Needed for Withdrawal (For CIWA-Ar 11-15). - Intravenous    Linked Group 3:  \"Or\" Linked Group Details        LORazepam (ATIVAN) injection 2 mg 2 mg Every 15 Minutes PRN 4/15/2019 4/25/2019    Sig - Route: Infuse 1 mL into a venous catheter Every 15 (Fifteen) Minutes As Needed for Withdrawal (For CIWA-Ar Greater Than 15.  Repeat Dose in 15 Minutes if CIWA-Ar Does Not Decrease). - Intravenous    Linked Group 3:  \"Or\" Linked Group Details        LORazepam (ATIVAN) injection 2 mg 2 mg Every 15 Minutes PRN 4/15/2019 4/25/2019    Sig - Route: Inject 1 mL into the appropriate muscle as directed by prescriber Every 15 (Fifteen) Minutes As Needed for Withdrawal (If Unable to Administer IV - For CIWA-Ar Greater Than 15.  Repeat Dose in 15 Minutes if CIWA-Ar Does Not Decrease). - Intramuscular    Linked Group 3:  \"Or\" Linked Group Details        LORazepam (ATIVAN) injection 4 mg 4 mg Every 1 Hour PRN 4/15/2019 4/25/2019    Sig - Route: Infuse 2 mL into a venous catheter Every 1 (One) Hour As Needed for Withdrawal (If Unable to Administer IV - For CIWA-Ar Greater Than 15 After 2 Doses of 2 mg IV/IM.  Repeat Dose in 1 Hour if CIWA-Ar Does Not Decrease). - Intravenous    Linked Group 3:  \"Or\" Linked Group Details        LORazepam (ATIVAN) tablet 1 mg 1 mg Every 2 Hours PRN 4/15/2019 4/25/2019    Sig - Route: Take 1 tablet by mouth Every 2 (Two) Hours As Needed for Withdrawal (For CIWA-Ar 8-10). - Oral    Linked Group 3:  \"Or\" Linked Group Details        LORazepam (ATIVAN) tablet 2 mg 2 mg Every 1 Hour PRN 4/15/2019 4/25/2019    Sig - Route: Take 2 tablets by mouth Every 1 (One) Hour As Needed for Withdrawal (For CIWA-Ar 11-15). - Oral    Linked Group 3:  \"Or\" Linked Group Details        LORazepam (ATIVAN) tablet 4 mg 4 mg Every 1 Hour PRN 4/15/2019 " "4/25/2019    Sig - Route: Take 4 tablets by mouth Every 1 (One) Hour As Needed for Withdrawal (For CIWA-Ar Greater Than 15 After 2 Doses of 2 mg IV/IM.  Repeat Dose in 1 Hour if CIWA-Ar Does Not Decrease). - Oral    Linked Group 3:  \"Or\" Linked Group Details        multivitamin (THERAGRAN) tablet 1 tablet 1 tablet Daily 4/16/2019 4/19/2019    Sig - Route: Take 1 tablet by mouth Daily. - Oral    Linked Group 2:  \"And\" Linked Group Details        ondansetron (ZOFRAN) injection 4 mg 4 mg Every 6 Hours PRN 4/15/2019     Sig - Route: Infuse 2 mL into a venous catheter Every 6 (Six) Hours As Needed for Nausea or Vomiting. - Intravenous    Linked Group 4:  \"Or\" Linked Group Details        ondansetron (ZOFRAN) tablet 4 mg 4 mg Every 6 Hours PRN 4/15/2019     Sig - Route: Take 1 tablet by mouth Every 6 (Six) Hours As Needed for Nausea or Vomiting. - Oral    Linked Group 4:  \"Or\" Linked Group Details        sodium chloride 0.9 % flush 3 mL 3 mL Every 12 Hours Scheduled 4/14/2019     Sig - Route: Infuse 3 mL into a venous catheter Every 12 (Twelve) Hours. - Intravenous    sodium chloride 0.9 % flush 3-10 mL 3-10 mL As Needed 4/14/2019     Sig - Route: Infuse 3-10 mL into a venous catheter As Needed for Line Care. - Intravenous    sodium chloride 0.9 % infusion 75 mL/hr Continuous 4/14/2019     Sig - Route: Infuse 75 mL/hr into a venous catheter Continuous. - Intravenous    thiamine (B-1) 100 mg in sodium chloride 0.9 % 100 mL IVPB 100 mg Once 4/15/2019 4/15/2019    Sig - Route: Infuse 100 mg into a venous catheter 1 (One) Time. - Intravenous    Linked Group 5:  \"Or\" Linked Group Details        thiamine (VITAMIN B-1) tablet 100 mg 100 mg Once 4/15/2019 4/15/2019    Sig - Route: Take 1 tablet by mouth 1 (One) Time. - Oral    Linked Group 5:  \"Or\" Linked Group Details        thiamine (VITAMIN B-1) tablet 100 mg 100 mg Daily 4/16/2019 4/19/2019    Sig - Route: Take 1 tablet by mouth Daily. - Oral    Linked Group 2:  \"And\" Linked " "Group Details        aspirin suppository 300 mg (Discontinued) 300 mg Daily 4/15/2019 4/14/2019    Sig - Route: Insert 1 suppository into the rectum Daily. - Rectal    Linked Group 6:  \"Or\" Linked Group Details        aspirin tablet 325 mg (Discontinued) 325 mg Daily 4/15/2019 4/14/2019    Sig - Route: Take 1 tablet by mouth Daily. - Oral    Linked Group 6:  \"Or\" Linked Group Details              Orders (last 24 hrs)     Start     Ordered    04/16/19 0900  thiamine (VITAMIN B-1) tablet 100 mg  Daily      04/15/19 0844    04/16/19 0900  multivitamin (THERAGRAN) tablet 1 tablet  Daily      04/15/19 0844    04/16/19 0900  folic acid (FOLVITE) tablet 1 mg  Daily      04/15/19 0844    04/16/19 0600  CBC Auto Differential  Daily     Comments:  Discontinue After Heparin Stopped      04/15/19 0418    04/15/19 1815  aspirin tablet 325 mg  Daily,   Status:  Discontinued      04/14/19 1817    04/15/19 1815  aspirin suppository 300 mg  Daily,   Status:  Discontinued      04/14/19 1817    04/15/19 1807  Place Sequential Compression Device  Once     Comments:  Begin after Alteplase therapy is complete.    04/14/19 1817    04/15/19 1807  Maintain Sequential Compression Device  Continuous     Comments:  Begin after Alteplase therapy is complete.    04/14/19 1817    04/15/19 1700  aspirin chewable tablet 81 mg  Daily      04/14/19 1837    04/15/19 1700  aspirin suppository 300 mg  Daily      04/14/19 1837    04/15/19 1500  CT Head Without Contrast  1 Time Imaging      04/14/19 1817    04/15/19 1113  Diet Regular; Thin  Diet Effective Now      04/15/19 1112    04/15/19 1032  Inpatient Admission  Once      04/15/19 1032    04/15/19 1000  aPTT  Daily     Comments:  Cancel If Patient Has Infusion Changes      04/15/19 0418    04/15/19 0930  thiamine (B-1) 100 mg in sodium chloride 0.9 % 100 mL IVPB  Once      04/15/19 0844    04/15/19 0930  thiamine (VITAMIN B-1) tablet 100 mg  Once      04/15/19 0844    04/15/19 0844  ondansetron " (ZOFRAN) tablet 4 mg  Every 6 Hours PRN      04/15/19 0844    04/15/19 0844  ondansetron (ZOFRAN) injection 4 mg  Every 6 Hours PRN      04/15/19 0844    04/15/19 0843  LORazepam (ATIVAN) tablet 1 mg  Every 2 Hours PRN      04/15/19 0844    04/15/19 0843  LORazepam (ATIVAN) injection 1 mg  Every 2 Hours PRN      04/15/19 0844    04/15/19 0843  LORazepam (ATIVAN) tablet 2 mg  Every 1 Hour PRN      04/15/19 0844    04/15/19 0843  LORazepam (ATIVAN) injection 2 mg  Every 1 Hour PRN      04/15/19 0844    04/15/19 0843  LORazepam (ATIVAN) injection 2 mg  Every 15 Minutes PRN      04/15/19 0844    04/15/19 0843  LORazepam (ATIVAN) injection 2 mg  Every 15 Minutes PRN      04/15/19 0844    04/15/19 0843  LORazepam (ATIVAN) tablet 4 mg  Every 1 Hour PRN      04/15/19 0844    04/15/19 0843  LORazepam (ATIVAN) injection 4 mg  Every 1 Hour PRN      04/15/19 0844    04/15/19 0843  Safety Precautions  Continuous      04/15/19 0844    04/15/19 0843  Vital Signs  Per Hospital Policy      04/15/19 0844    04/15/19 0843  Continuous Pulse Oximetry  Continuous      04/15/19 0844    04/15/19 0843  Obtain Baseline Clinical [place] Withdrawal Assessment - Ar, Sedation Scale & Vital Signs  Once     Comments:  Follow CIWA Scoring Reference Guide    04/15/19 0844    04/15/19 0843  Clinical [place] Withdrawal Assessment (CIWA-Ar)  Per Hospital Policy      04/15/19 0844    04/15/19 0843  If CIWA Score Less Than 8 Monitor Every 4 Hours & Then Discontinue Assessment - Restart Scoring Assessment & Protocol If Symptoms Reappear  Continuous      04/15/19 0844    04/15/19 0843  Obtain Pre & Post Sedation Scores With Every Lorazepam Dose - Hold For POSS Greater Than 2 or RASS of -3 or Less  Continuous      04/15/19 0844    04/15/19 0843  Seizure Precautions  Continuous      04/15/19 0844    04/15/19 0843  Notify Provider - Withdrawal  Until Discontinued      04/15/19 0844    04/15/19 0843  Notify Provider of Abnormal Lab Results  Until  Discontinued      04/15/19 0844    04/15/19 0843  Notify Provider - Vitals  Until Discontinued      04/15/19 0844    04/15/19 0726  acetaminophen (TYLENOL) tablet 650 mg  Every 4 Hours PRN      04/15/19 0727    04/15/19 0600  Hemoglobin A1c  Morning Draw      04/14/19 1817    04/15/19 0600  Lipid Panel  Morning Draw      04/14/19 1817    04/15/19 0600  CBC Auto Differential  PROCEDURE ONCE,   Status:  Canceled     Comments:  Discontinue After Heparin Stopped      04/15/19 0003    04/15/19 0552  Inpatient Pulmonology Consult  Once     Specialty:  Pulmonary Disease  Provider:  Sophy Guadalupe MD    04/15/19 0552    04/15/19 0430  CBC & Differential  Daily     Comments:  Discontinue After Heparin Stopped      04/14/19 1859    04/15/19 0430  aPTT  Daily,   Status:  Canceled     Comments:  Cancel If Patient Has Infusion Changes      04/14/19 1859    04/15/19 0430  CBC Auto Differential  PROCEDURE ONCE     Comments:  Discontinue After Heparin Stopped      04/14/19 2230    04/15/19 0404  POC Glucose Once  Once      04/15/19 0402    04/15/19 0005  POC Glucose Once  Once      04/15/19 0003    04/15/19 0000  POC Glucose Q6H  Every 6 Hours,   Status:  Canceled     Comments:  May Discontinue After 2 Consecutive Readings Less Than 140Notify Provider if 2 Readings Greater Than 140      04/14/19 1817    04/15/19 0000  Inpatient Neuro Clinical Specialist Consult  Once     Provider:  (Not yet assigned)    04/14/19 1817 04/14/19 2200  CT Head Without Contrast  1 Time Imaging      04/14/19 1836    04/14/19 2100  sodium chloride 0.9 % flush 3 mL  Every 12 Hours Scheduled      04/14/19 1817    04/14/19 2100  atorvastatin (LIPITOR) tablet 80 mg  Nightly      04/14/19 1817 04/14/19 2000  Intake and Output  Every 4 Hours      04/14/19 1817 04/14/19 1955  POC Glucose Once  Once      04/14/19 1953 04/14/19 1945  heparin 69713 units/250 mL (100 units/mL) in 0.45 % NaCl infusion  Titrated      04/14/19 1859 04/14/19 1915  sodium  chloride 0.9 % infusion  Continuous      04/14/19 1817    04/14/19 1915  clevidipine (CLEVIPREX) infusion 0.5 mg/mL  Titrated      04/14/19 1817    04/14/19 1900  iopamidol (ISOVUE-370) 76 % injection 150 mL  Once in Imaging      04/14/19 1802    04/14/19 1900  Adjust Heparin Rate Based on aPTT Using Nomogram  Continuous      04/14/19 1859    04/14/19 1900  Verify All Anticoagulant Orders Are Discontinued Upon Initiation of Heparin Protocol (eg Enoxaparin, Fondaparinux, Apixaban, Dabigatran, Edoxaban, or Rivaroxaban)  Once      04/14/19 1859    04/14/19 1900  CBC & Differential  Once     Comments:  Prior to Initial Heparin Bolus      04/14/19 1859 04/14/19 1900  RN To Release aPTT Order 6 Hours After Heparin Bolus & 6 Hours After Any Heparin Rate Change  Continuous      04/14/19 1859    04/14/19 1900  Protime-INR  Once     Comments:  Prior to Initial Heparin Bolus      04/14/19 1859    04/14/19 1900  aPTT  Once     Comments:  Prior to Initial Heparin Bolus      04/14/19 1859    04/14/19 1900  CBC Auto Differential  PROCEDURE ONCE     Comments:  Prior to Initial Heparin Bolus      04/14/19 1859    04/14/19 1849  POC Glucose Once  Once      04/14/19 1828    04/14/19 1809  Adult Transthoracic Echo Complete W/ Cont if Necessary Per Protocol (With Agitated Saline)  Once     Comments:  Routine 2D echo    04/14/19 1817    04/14/19 1808  MRI Brain Without Contrast  1 Time Imaging      04/14/19 1817    04/14/19 1807  Code Status and Medical Interventions:  Continuous      04/14/19 1817    04/14/19 1807  Assessed for Rehabilitation Services  Once      04/14/19 1817    04/14/19 1807  Repeat NIHSS Immediately After TPA Infusion Complete  Once      04/14/19 1817    04/14/19 1807  AVOID Indwelling Urinary Catheterization During TPA Infusion & For At Least 24 Hours Post Infusion  Continuous      04/14/19 1817    04/14/19 1807  Bleeding Precautions  Continuous      04/14/19 1817    04/14/19 1807  Post TPA Precautions  Continuous       04/14/19 1817 04/14/19 1807  Vital Signs TPA (Alteplase) Monitoring  Per Hospital Policy     Comments:  Every 15 Minutes x2 Hours  Every 30 Minutes x6 Hours  Every Hour x16 Hours;  Then Per Unit Routine   Keep Systolic Blood Pressure Less Than 180, Diastolic Blood Pressure Less Than 105    04/14/19 1817 04/14/19 1807  Vital Signs after TPA (Alteplase) Monitoring  Per Hospital Policy     Comments:  In ICU: every 2 hours  In Telemetry Unit: every 4 hours   Keep Systolic Blood Pressure Less Than 180, Diastolic Blood Pressure Less Than 105    04/14/19 1817 04/14/19 1807  Pulse Oximetry, Continuous  Continuous,   Status:  Canceled      04/14/19 1817 04/14/19 1807  Cardiac Monitoring  Continuous      04/14/19 1817 04/14/19 1807  Activity - Strict Bed Rest  Until Discontinued      04/14/19 1817 04/14/19 1807  Turn Patient  Now Then Every 2 Hours      04/14/19 1817 04/14/19 1807  Neuro Checks  Per Hospital Policy     Comments:  For ICU Admission: Neuro Checks to Include All Stroke Deficits Every Hour x10 Hours Then Every 2 Hours  For Telemetry Unit Admission: Neuro Checks to Include All Stroke Deficits Every 4 Hours    04/14/19 1817 04/14/19 1807  NIHSS Assessment  Every Shift     Comments:  Turn off all sedation medications prior to performing assessment. Assessment to be performed upon admission, transfer to another unit, discharge, and with neurological decline. If NIHSS change is greater than or equal to 4 and/or neurological decline is noted notify physician.    04/14/19 1817 04/14/19 1807  Provide Stroke Education Material  Prior to Discharge     Comments:  Educate patient PRN and daily during hospitalization.    04/14/19 1817 04/14/19 1807  Tobacco Cessation Education  Once      04/14/19 1817 04/14/19 1807  Nursing Dysphagia Screening (Complete Prior to Giving Anything By Mouth)  Once      04/14/19 1817 04/14/19 1807  RN to Place Order SLP Consult - Eval & Treat Choosing  Reason of RN Dysphagia Screen Failed  Continuous     Comments:  RN to Place Order SLP Consult - Eval & Treat Choosing Reason of RN Dysphagia Screen Failed    04/14/19 1817    04/14/19 1807  Nurse to Call MD or Nutrition Services for Diet if Patient Passes Dysphagia Screen  Once      04/14/19 1817    04/14/19 1807  Notify Provider  Until Discontinued     Comments:  BP should be maintained <180/105 mm Hg for at least the first 24 hours after IV alteplase treatment    04/14/19 1817    04/14/19 1807  NPO Diet  Diet Effective Now,   Status:  Canceled     Comments:  Strict NPO Until Nursing Dysphagia Screen Passed    04/14/19 1817    04/14/19 1807  Inpatient Rehab Admission Consult  Once     Provider:  (Not yet assigned)    04/14/19 1817    04/14/19 1807  OT Consult: Eval & Treat Stroke Patient  Once      04/14/19 1817    04/14/19 1807  PT Consult: Eval & Treat  Once      04/14/19 1817    04/14/19 1807  SLP Consult: Eval & Treat  Once     Comments:  Per stroke protocol.    04/14/19 1817    04/14/19 1807  Inpatient Case Management  Consult  Once     Provider:  (Not yet assigned)    04/14/19 1817 04/14/19 1807  Inpatient Diabetes Educator Consult  Once,   Status:  Canceled     Provider:  (Not yet assigned)    04/14/19 1817    04/14/19 1807  NO VENIPUNCTURE FOR 24 HOURS POST ALTEPLASE ADMINISTRATION  Continuous      04/14/19 1817    04/14/19 1807  Insert Peripheral IV  Once      04/14/19 1817    04/14/19 1807  Saline Lock & Maintain IV Access  Continuous      04/14/19 1817    04/14/19 1806  sodium chloride 0.9 % flush 3-10 mL  As Needed      04/14/19 1817    04/14/19 1708  CT Angiogram Head With & Without Contrast  1 Time Imaging      04/14/19 1708    04/14/19 1708  CT Angiogram Neck With & Without Contrast  1 Time Imaging      04/14/19 1708    04/14/19 1708  CT Cerebral Perfusion With & Without Contrast  1 Time Imaging      04/14/19 1708    04/14/19 1708  NPO Diet  Diet Effective Now,   Status:  Canceled      Comments:  NPO Until Stroke Dysphagia Screen Completed  Evaluate Diet Post Screening    04/14/19 1708    Unscheduled  Order CT Head Without Contrast for Neurological Decline  As Needed      04/14/19 1817    Unscheduled  aPTT  As Needed      04/14/19 1859             Physician Progress Notes (last 24 hours) (Notes from 4/14/2019 11:35 AM through 4/15/2019 11:35 AM)      Ousmane Blackburn MD at 4/15/2019  8:19 AM                   LOS: 1 day   Patient Care Team:  Destini Castillo APRN as PCP - General (Nurse Practitioner)    Subjective     Patient notes he is doing very well he has a mild headache he just got some Tylenol and that is better.  He notes his vision and his right hand strength and function is much improved    Review of Systems:   He does drink alcohol 12-24 ounces per day he is never had any alcohol withdrawal or alcohol problems.  He does not use illicit drugs or smoke tobacco       Objective     Vital Signs  Vital Sign Min/Max for last 24 hours  Temp  Min: 97.9 °F (36.6 °C)  Max: 99 °F (37.2 °C)   BP  Min: 134/96  Max: 174/94   Pulse  Min: 68  Max: 100   Resp  Min: 18  Max: 18   SpO2  Min: 95 %  Max: 100 %   No Data Recorded   Weight  Min: 80.4 kg (177 lb 4 oz)  Max: 81.2 kg (179 lb 0.2 oz)        Ventilator/Non-Invasive Ventilation Settings (From admission, onward)    None                       Body mass index is 24.04 kg/m².  I/O last 3 completed shifts:  In: 1030 [I.V.:1030]  Out: 1550 [Urine:1550]  No intake/output data recorded.        Physical Exam:  General Appearance: Well-developed white male resting in bed does not appear in any acute distress  Eyes: Conjunctiva are clear and anicteric pupils are equal and reactive to light, extraocular muscles are intact, I cannot detect any visual field defect to confrontation today.  ENT: Mucous membranes are moist no erythema no exudates, no facial asymmetry tongue is midline, speech is fluent  Neck: No adenopathy no thyromegaly no jugular venous  distention, trachea midline  Lungs: Clear nonlabored symmetric expansion no wheezes rales or rhonchi  Cardiac: Regular rate rhythm no murmur no gallop  Abdomen: Soft nontender no palpable organomegaly or masses  : Not examined  Musculoskeletal: Grossly normal  Skin: He has a superficial abrasion on his right pretibial region no evidence of infection erythema or drainage.  No jaundice, no petechiae  Neuro: He is alert and oriented x3 he is following commands well in fact cranial nerves II through XII are intact to formal exam.  There is mild right pronator drift.  Good  strength finger to nose to finger without difficulty.  Heel to shin without difficulty no difficulties with straight leg raise dorsiflexion or plantarflexion of the feet.  Extremities/P Vascular: No clubbing no cyanosis no edema palpable radial and dorsalis pedis pulses bilaterally  MSE: He seems a little depressed       Labs:      CrCl cannot be calculated (No order found.).      Results from last 7 days   Lab Units 04/15/19  0338 04/14/19 2000   WBC 10*3/mm3 7.53 7.42   RBC 10*6/mm3 4.34 4.59   HEMOGLOBIN g/dL 13.9 14.7   HEMATOCRIT % 40.8 42.8   MCV fL 94.0 93.2   MCH pg 32.0 32.0   MCHC g/dL 34.1 34.3   RDW % 12.6 12.4   RDW-SD fl 43.6 42.5   MPV fL 9.8 9.6   PLATELETS 10*3/mm3 223 251   NEUTROPHIL % % 62.2 79.1*   LYMPHOCYTE % % 29.5 15.1*   MONOCYTES % % 7.3 4.7*   EOSINOPHIL % % 0.4 0.1*   BASOPHIL % % 0.5 0.5   IMM GRAN % % 0.1 0.5   NEUTROS ABS 10*3/mm3 4.68 5.86   LYMPHS ABS 10*3/mm3 2.22 1.12   MONOS ABS 10*3/mm3 0.55 0.35   EOS ABS 10*3/mm3 0.03 0.01   BASOS ABS 10*3/mm3 0.04 0.04   IMMATURE GRANS (ABS) 10*3/mm3 0.01 0.04   NRBC /100 WBC 0.0 0.0                         Results from last 7 days   Lab Units 04/14/19 2000   INR  1.12*     Microbiology Results (last 10 days)     ** No results found for the last 240 hours. **                aspirin 81 mg Oral Daily   Or      aspirin 300 mg Rectal Daily   atorvastatin 80 mg Oral Nightly    sodium chloride 3 mL Intravenous Q12H       clevidipine 2-32 mg/hr    heparin (porcine) 12 Units/kg/hr Last Rate: 15 Units/kg/hr (04/15/19 0404)   sodium chloride 75 mL/hr Last Rate: 100 mL/hr (04/15/19 0402)       Diagnostics:  Ct Angiogram Head With & Without Contrast    Result Date: 4/15/2019  CT HEAD WITHOUT CONTRAST, CT ANGIOGRAM HEAD AND NECK WITH IV CONTRAST, CT CEREBRAL PERFUSION EXAM WITH AND WITHOUT CONTRAST.  TECHNIQUE: Head CT as obtained without IV contrast. CT angiogram of the head and neck was obtained with IV contrast in the arterial phase and this data was reconstructed in coronal and sagittal planes. 3-D volume rendering was performed. CT cerebral perfusion exam was obtained with and without IV contrast.  FINDINGS: Noncontrasted head CT demonstrates no abnormal area of increased attenuation tracks to suggest hemorrhage. No extra-axial fluid collection is observed. There is no mass effect or shift of midline structures.  No extraaxial fluid collection is evident. Ventricles appear within normal limits for size and configuration.  Within the distal cervical internal carotid artery there is a linear internal filling defect that begins at the horizontal level of the C1 ring. Linear, irregular filling defect extends cephalad through the petrous segment to the junction of the petrous and cavernous segments of the left ICA. This linear filling defect has the appearance of a dissection associated with areas of thrombus up to moderate stenosis without complete stenosis or occlusion. The cavernous and supracavernous left ICA is patent.  There is atherosclerotic calcified plaque involving the carotid bulbs and both ICA origins without NASCET stenosis of the proximal ICAs or carotid bulbs. Both common carotid arteries are patent. The right cervical ICA is patent. The right petrous and cavernous and supracavernous ICA is patent. Both ACAs and MCAs and major branches appear patent.  The right vertebral artery  is larger than the left. There is flow within both vertebral arteries. The left vertebral artery terminates in the left PICA. There is flow within the intracranial right vertebral artery. Basilar artery is somewhat small. There is a fetal origin of the left posterior cerebral artery. Both posterior cerebral arteries are patent.  CT cerebral perfusion exam with Rapid software demonstrates 0 mL where there is CBF less than 30% volume and 0 mL where there is Tmax greater than 6 seconds. With a cut off Tmax greater than 4 seconds, there is a slight delayed perfusion to the region of the left parietal lobe and to lesser degree within the right frontal lobe. The postcontrast enhanced head CT demonstrates no abnormal intracranial enhancement. Within the medial left maxillary sinus there is an 8 mm mucous retention cyst.      1. Linear intraluminal filling defect within the distal left cervical and petrous segments of the left internal carotid artery begins at the horizontal level of the C1 ring and extends to the junction of the petrous and cavernous segments.   The appearance is consistent with a dissection with associated thrombus and is associated with moderate narrowing. 2. CT cerebral perfusion exam demonstrates no focal areas where there is CBF less than 30% volume or Tmax greater than 6 seconds. Noncontrasted head CT demonstrates no evidence for intracranial hemorrhage or acute abnormality. MRI evaluation may be helpful to evaluate acute stroke in this patient with right-sided weakness.  Findings of a noncontrast exam discussed with Dr. Bagley while the patient was on the CT table at 1740. Findings of a contrast exam discussed with Dr. Bagley at 1815 p.m.  This report was finalized on 4/15/2019 7:20 AM by Dr. Francois Perry M.D.      Ct Head Without Contrast    Result Date: 4/14/2019  CRANIAL CT SCAN WITHOUT CONTRAST  CLINICAL HISTORY: Stroke  COMPARISON: 4:35 PM, outside exam.  TECHNIQUE: Radiation dose  reduction techniques were utilized, including automated exposure control and exposure modulation based on body size. Multiple axial images of the head were obtained without contrast.  FINDINGS:  There is residual iodinated contrast from prior CT angiogram. No acute hemorrhage is appreciated considering the presence of residual contrast. There is no mass effect or midline shift identified. Ventricles, sulci, and cisterns appear normal. Bone window images show chronic appearing paranasal sinus disease, greatest in the left maxillary.         1. Residual iodinated contrast is noted, no acute intracranial finding or CT evidence of acute CVA.      This report was finalized on 4/14/2019 10:21 PM by Luis E Peña M.D.      Ct Angiogram Neck With & Without Contrast    Result Date: 4/15/2019  CT HEAD WITHOUT CONTRAST, CT ANGIOGRAM HEAD AND NECK WITH IV CONTRAST, CT CEREBRAL PERFUSION EXAM WITH AND WITHOUT CONTRAST.  TECHNIQUE: Head CT as obtained without IV contrast. CT angiogram of the head and neck was obtained with IV contrast in the arterial phase and this data was reconstructed in coronal and sagittal planes. 3-D volume rendering was performed. CT cerebral perfusion exam was obtained with and without IV contrast.  FINDINGS: Noncontrasted head CT demonstrates no abnormal area of increased attenuation tracks to suggest hemorrhage. No extra-axial fluid collection is observed. There is no mass effect or shift of midline structures.  No extraaxial fluid collection is evident. Ventricles appear within normal limits for size and configuration.  Within the distal cervical internal carotid artery there is a linear internal filling defect that begins at the horizontal level of the C1 ring. Linear, irregular filling defect extends cephalad through the petrous segment to the junction of the petrous and cavernous segments of the left ICA. This linear filling defect has the appearance of a dissection associated with areas of  thrombus up to moderate stenosis without complete stenosis or occlusion. The cavernous and supracavernous left ICA is patent.  There is atherosclerotic calcified plaque involving the carotid bulbs and both ICA origins without NASCET stenosis of the proximal ICAs or carotid bulbs. Both common carotid arteries are patent. The right cervical ICA is patent. The right petrous and cavernous and supracavernous ICA is patent. Both ACAs and MCAs and major branches appear patent.  The right vertebral artery is larger than the left. There is flow within both vertebral arteries. The left vertebral artery terminates in the left PICA. There is flow within the intracranial right vertebral artery. Basilar artery is somewhat small. There is a fetal origin of the left posterior cerebral artery. Both posterior cerebral arteries are patent.  CT cerebral perfusion exam with Rapid software demonstrates 0 mL where there is CBF less than 30% volume and 0 mL where there is Tmax greater than 6 seconds. With a cut off Tmax greater than 4 seconds, there is a slight delayed perfusion to the region of the left parietal lobe and to lesser degree within the right frontal lobe. The postcontrast enhanced head CT demonstrates no abnormal intracranial enhancement. Within the medial left maxillary sinus there is an 8 mm mucous retention cyst.      1. Linear intraluminal filling defect within the distal left cervical and petrous segments of the left internal carotid artery begins at the horizontal level of the C1 ring and extends to the junction of the petrous and cavernous segments.   The appearance is consistent with a dissection with associated thrombus and is associated with moderate narrowing. 2. CT cerebral perfusion exam demonstrates no focal areas where there is CBF less than 30% volume or Tmax greater than 6 seconds. Noncontrasted head CT demonstrates no evidence for intracranial hemorrhage or acute abnormality. MRI evaluation may be helpful to  evaluate acute stroke in this patient with right-sided weakness.  Findings of a noncontrast exam discussed with Dr. Bagley while the patient was on the CT table at 1740. Findings of a contrast exam discussed with Dr. Bagley at 1815 p.m.  This report was finalized on 4/15/2019 7:20 AM by Dr. Francois Perry M.D.      Ct Cerebral Perfusion With & Without Contrast    Result Date: 4/15/2019  CT HEAD WITHOUT CONTRAST, CT ANGIOGRAM HEAD AND NECK WITH IV CONTRAST, CT CEREBRAL PERFUSION EXAM WITH AND WITHOUT CONTRAST.  TECHNIQUE: Head CT as obtained without IV contrast. CT angiogram of the head and neck was obtained with IV contrast in the arterial phase and this data was reconstructed in coronal and sagittal planes. 3-D volume rendering was performed. CT cerebral perfusion exam was obtained with and without IV contrast.  FINDINGS: Noncontrasted head CT demonstrates no abnormal area of increased attenuation tracks to suggest hemorrhage. No extra-axial fluid collection is observed. There is no mass effect or shift of midline structures.  No extraaxial fluid collection is evident. Ventricles appear within normal limits for size and configuration.  Within the distal cervical internal carotid artery there is a linear internal filling defect that begins at the horizontal level of the C1 ring. Linear, irregular filling defect extends cephalad through the petrous segment to the junction of the petrous and cavernous segments of the left ICA. This linear filling defect has the appearance of a dissection associated with areas of thrombus up to moderate stenosis without complete stenosis or occlusion. The cavernous and supracavernous left ICA is patent.  There is atherosclerotic calcified plaque involving the carotid bulbs and both ICA origins without NASCET stenosis of the proximal ICAs or carotid bulbs. Both common carotid arteries are patent. The right cervical ICA is patent. The right petrous and cavernous and supracavernous  ICA is patent. Both ACAs and MCAs and major branches appear patent.  The right vertebral artery is larger than the left. There is flow within both vertebral arteries. The left vertebral artery terminates in the left PICA. There is flow within the intracranial right vertebral artery. Basilar artery is somewhat small. There is a fetal origin of the left posterior cerebral artery. Both posterior cerebral arteries are patent.  CT cerebral perfusion exam with Rapid software demonstrates 0 mL where there is CBF less than 30% volume and 0 mL where there is Tmax greater than 6 seconds. With a cut off Tmax greater than 4 seconds, there is a slight delayed perfusion to the region of the left parietal lobe and to lesser degree within the right frontal lobe. The postcontrast enhanced head CT demonstrates no abnormal intracranial enhancement. Within the medial left maxillary sinus there is an 8 mm mucous retention cyst.      1. Linear intraluminal filling defect within the distal left cervical and petrous segments of the left internal carotid artery begins at the horizontal level of the C1 ring and extends to the junction of the petrous and cavernous segments.   The appearance is consistent with a dissection with associated thrombus and is associated with moderate narrowing. 2. CT cerebral perfusion exam demonstrates no focal areas where there is CBF less than 30% volume or Tmax greater than 6 seconds. Noncontrasted head CT demonstrates no evidence for intracranial hemorrhage or acute abnormality. MRI evaluation may be helpful to evaluate acute stroke in this patient with right-sided weakness.  Findings of a noncontrast exam discussed with Dr. Bagley while the patient was on the CT table at 1740. Findings of a contrast exam discussed with Dr. Bagley at 1815 p.m.  This report was finalized on 4/15/2019 7:20 AM by Dr. Francois Perry M.D.               There are no hospital problems to display for this  "patient.        Assessment/Plan     1. Acute left MCA territory stroke status post TPA management per stroke service  2. Left internal carotid artery dissection  3. Right homonymous hemianopsia secondary to #1 seems to have improved dramatically  4. Right hemiparesis secondary #1 physical and occupational therapy evaluations  5. Poorly controlled hypertension permissive hypertension but trying to keep under 180 systolic.  Eventually lower to goal blood pressures  6.  dyslipidemia patient is on chronic management levels look good today  7. Headache improving after tylenol  8. Snoring and physical features suggestive of sleep apnea he should have a sleep study as an outpatient  9. Daily alcohol use we will have to watch for any signs of alcohol withdrawal.  At this point his use if he is correct his really quite low I am not going to schedule any benzodiazepines as that may compromise our neurologic exam.    Plan for disposition:    Ousmane Blackburn MD  04/15/19  8:19 AM    Time: Went over 45 minutes on patient's care today      Electronically signed by Ousmane Blackburn MD at 4/15/2019  8:45 AM          Consult Notes       Sophy Guadalupe MD at 4/14/2019  7:17 PM                       Referring Provider: Dr. Bagley  Reason for Consultation: ICU care post TPA    Patient Care Team:  Destini Castillo APRN as PCP - General (Nurse Practitioner)    Chief complaint:   Left eye blurry vision.  Transferred from [place] after TPA for suspected stroke.    History of present illness:    Subjective    Source of information: Patient, wife, medical records.     This is a 59 year old male patient, retired with hx of HTN and dyslipidemia.   He was working on his yard and was bending over to  tree branches when he suddenly felt \"fog in his left eye\". This was followed by weakness in his legs and was stumbling around when he walked back to his house. He went to the ED at [place]. Initial CT brain was negative. It was " decided with treat him with TPA which was administered at 1600. Following TPA, he developed headache, left frontal, moderate in intensity and some numbness in his right arm in addition to difficulty finding words. This symptoms persisted when he was transferred to our facility.     Concerning his HTN, he stated that he takes his medications regularly but could not recall the name of the blood pressure medication.  He has some difficulties finding words.  He said that his blood pressure is usually normal when he sees his primary care physician.  He does not check at home.    I reviewed the labs from [facility]. BMP was normal.     CT brain and CT perfusion images where done on arrival to [facility] and were consistent with left ICA dissection.       Review of Systems  Constitutional: No fever or chills. No change in appetite.   ENMT: No sinus congestion or post nasal drip. Loud snoring. He sleeps with his head elevated.   Cardiovascular: No chest pain, palpitation or legs swelling.    Respiratory: No dyspnea, cough or wheezing.  Gastrointestinal: No constipation, diarrhea or abdominal pain   Neurology: Headache. Right arm weakness and numbness. No dizziness.   Musculoskeletal: No joints pain, stiffness or swelling.   Psychiatry: No depression.  Genitourinary: No dysuria or frequent urination  Endo: No weight changes. No cold or warm intolerance.  Lymphatic: No swollen glands.  Integumentary: No rash.    History  PMH:  -Hypertension  -Dyslipidemia    PSH: None.  Medications: Blood pressure medications and statin.  Does not recall the names    Allergies: NKDA.    Family history: Father had open heart surgery and grandfather  from a heart attack.  Otherwise, negative for stroke.    Objective     Vital Signs   Temp:  [97.9 °F (36.6 °C)] 97.9 °F (36.6 °C)  Heart Rate:  [] 100  BP: (168-174)/(88-94) 174/94    Physical Exam:  Constitutional: Not in acute distress.  Eyes: Injected conjunctiva, EOMI.  ENMT: Leandra 3.  Mallampati 3. No oral thrush. Tonsils grade  Neck: Large. Trachea midline. No thyromegaly  Heart: RRR, no murmur  Lungs: Good and equal air entry bilaterally.  Non labored breathing.   Abdomen:Soft. No tenderness or dullness. No HSM.  Extremities: No cyanosis, clubbing or pitting edema: . Moves all extremities.  Neuro: Conscious, alert, oriented x3.  Expressive aphasia.  Loss of visual field on the right.  Right arm 4+/5 otherwise 5/5.  Decreased sensitivity on the right arm.  Psych: Appropriate mood and affect.    Integumentary: No rash  Lymphatic: No palpable cervical or supraclavicular lymph nodes.      Diagnostic imaging:  I personally and independently reviewed the following images:   CT brain without contrast 4/14/19: No acute changes        Assessment     1. Acute left MCA stroke  2. left internal carotid artery dissection  3. Right homonymous hemianopia, 2ary to #1  4. Right arm weakness, 2ary to #1  5. Headache  6. HTN, currently uncontrolled  7. Dyslipidemia  8. Snoring with physical features suggestive of REJI    Recommendations:  · Admit to ICU  · Neuro check per protocol.   · PT OT, speech eval.   · Blood pressure monitoring. Allow permissive HTN. But avoid BP>180 due to recent TPA and dissection.   · Neuro following. Started on Heparin.  · Aspirin and Lipitor  · Recommend outpatient sleep eval for suspected REJI          I discussed the patients findings and my recommendations with patient, family and nursing staff    Sophy Guadalupe MD  04/14/19  7:18 PM              Electronically signed by Sophy Guadalupe MD at 4/14/2019  8:18 PM     Kody Bagley MD at 4/14/2019  6:43 PM          Neurology Consult Note    Consult Date: 4/15/2019    Referring MD: Kody Bagley*    Reason for Consult I have been asked to see the patient in neurological consultation to render advice and opinion regarding acute stroke    Neeraj Frazier is a 59 y.o. male who presented to [facility] this  afternoon with left eye vision loss, speech difficulty, and right sided weakness.     LKN  1430.  He was working in his yard after having used his tractor and noticed blurred vision of the left eye.  If he closed his left eye his vision was normal.  He walked back to his house and noticed that he was mildly ataxic dragging the right leg.  His wife took him to the emergency department where he was given TPA.  Shortly after TPA he developed a headache then severe aphasia and flaccid right hemiplegia.  He was transferred here and by then his symptoms have improved somewhat, he had some degree of aphasia but was moving the right side but with poor coordination.  CT showed a left distal ICA dissection and CT perfusion was negative.  He was started on heparin drip 3 hours after TPA was completed and overnight his headache improved and his aphasia gradually resolved.  He still has a little bit of mild right hand clumsiness but is otherwise back to his neurologic baseline.    Past Medical/Surgical Hx:  Essential hypertension  Mixed hyperlipidemia    Medications On Admission  No medications prior to admission.       Allergies:  No Known Allergies    Social Hx:  Social History     Socioeconomic History   • Marital status:      Spouse name: Not on file   • Number of children: Not on file   • Years of education: Not on file   • Highest education level: Not on file   Tobacco Use   • Smoking status: Never Smoker   • Smokeless tobacco: Never Used   Substance and Sexual Activity   • Alcohol use: Yes     Alcohol/week: 2.4 oz     Types: 4 Cans of beer per week     Frequency: 4 or more times a week     Drinks per session: 3 or 4     Binge frequency: Weekly   • Drug use: No   • Sexual activity: Defer       Family Hx:  No family history of early CAD or stroke    Review of Systems  Constitutional: [No fevers, chills]  Eye: [+   recent visual problems, no eye discharge]  HEENT: [No ear pain, nasal congestion]  Respiratory: [No  "shortness of breath, cough]  Cardiovascular: [No Chest pain, palpitations]  Gastrointestinal: [No nausea, vomiting]  Genitourinary: [No hematuria, incontinence]  Hema/Lymph: [no nosebleeds, history of anticoagulation]  Endocrine: [Negative for excessive urination, heat or cold intolerance]  Musculoskeletal: [No back pain, neck pain]  Integumentary: [No rash, pruritus]  Neurologic: [No weakness, numbness]  Psychiatric: [No anxiety, depression]    Exam    /88   Pulse 71   Temp 98.7 °F (37.1 °C) (Oral)   Resp 16   Ht 182.9 cm (72\")   Wt 80.4 kg (177 lb 4 oz)   SpO2 97%   BMI 24.04 kg/m²    gen: NAD, vitals reviewed  Eyes: fundus sharp with no papilledema or retinal hemorrhages  HEENT: no nuchal rigidity  CVS: RRR, S1, S2  MS: oriented x3, recent/remote memory intact, normal attention/concentration, language intact, no neglect, normal fund of knowledge  CN: visual acuity grossly normal, visual fields full, PERRL, EOMI, facial sensation equal, no facial droop, hearing symmetric, palate elevates symmetrically, shoulder shrug equal, tongue midline  Motor: Right upper extremity pronator drift, otherwise 5/5 throughout upper and lower extremities, normal tone  Sensation: intact to vibration and temperature throughout  Reflexes: 2+ throughout upper and lower extremities, downgoing plantars  Coordination: Decreased coordination right upper extremity, no dysmetria left upper extremity.  Gait: no ataxia    DATA:    No results found for: GLUCOSE, CALCIUM, NA, K, CO2, CL, BUN, CREATININE, EGFRIFAFRI, EGFRIFNONA, BCR, ANIONGAP  Lab Results   Component Value Date    WBC 7.53 04/15/2019    HGB 13.9 04/15/2019    HCT 40.8 04/15/2019    MCV 94.0 04/15/2019     04/15/2019     Lab Results   Component Value Date    CHOL 162 04/15/2019     Lab Results   Component Value Date    HDL 66 (H) 04/15/2019     Lab Results   Component Value Date    LDL 80 04/15/2019     Lab Results   Component Value Date    TRIG 82 04/15/2019 "     Lab Results   Component Value Date    HGBA1C 5.30 04/15/2019     Lab Results   Component Value Date    INR 1.12 (H) 04/14/2019    PROTIME 14.1 04/14/2019       Lab review: LDL 80, hemoglobin A1c normal    Imaging review: CTH, CTA H/N, CTP reviewed and discussed with the reading radiologist. CTH is negative for stroke or ICH, CTA shows left distal cervical dissection with thrombus, CTP negative.    Impression:  1) Acute stroke, left middle cerebral artery, atheroembolic from left ICA thrombus  2) Left carotid dissection  3) Thrombus    Comment: 5-year-old man, previously healthy except for hypertension hyperlipidemia, developed left ICA dissection yesterday with resultant thrombus.  Seems he had an embolic event from that thrombus which led him to present to [place], following TPA he had a second embolic event which resulted in aphasia and severe weakness.  He was started on heparin drip several hours after TPA and is done well overnight.  Plan is to continue heparin to continue to stabilize his thrombus.  Will get MRI today to clarify the extent of his stroke.    PLAN:  1. Heparin gtt  2. Swallow eval  3. MRI brain today in place of 24H CT  4. ASA, statin, after first dose of ASA today will continue heparin gtt alone with plan of transitioning to warfarin until his thrombus resolves.      Electronically signed by Kody Bagley MD at 4/15/2019  8:50 AM

## 2019-04-15 NOTE — THERAPY DISCHARGE NOTE
Acute Care - Speech Language Pathology   Swallow Eval/Discharge [facility]     Patient Name: Neeraj Frazier  : 1960  MRN: [id number]  Today's Date: 4/15/2019               Admit Date: 2019    Visit Dx:  No diagnosis found.  Patient Active Problem List   Diagnosis   • CVA (cerebral vascular accident) (CMS/HCC)     Past Medical History:   Diagnosis Date   • Hyperlipemia    • Hypertension      No past surgical history on file.       SWALLOW EVALUATION (last 72 hours)      SLP Adult Swallow Evaluation     Row Name 04/15/19 1100                   Rehab Evaluation    Document Type  evaluation  -MT        Subjective Information  no complaints  -MT        Patient Effort  excellent  -MT           General Information    Patient Profile Reviewed  yes  -MT        Pertinent History Of Current Problem  CVA on , given tPa at [place] and transported to [place]. Initial symptoms of weakness and aphasia have resolved.   -MT        Current Method of Nutrition  NPO  -MT        Precautions/Limitations, Vision  WFL with corrective lenses  -MT        Precautions/Limitations, Hearing  WFL  -MT        Prior Level of Function-Communication  WFL  -MT        Prior Level of Function-Swallowing  no diet consistency restrictions  -MT        Plans/Goals Discussed with  patient and family  -MT        Barriers to Rehab  none identified  -MT        Patient's Goals for Discharge  return to PO diet  -MT        Family Goals for Discharge  patient able to return to PO diet  -MT           Oral Motor and Function    Dentition Assessment  natural, present and adequate  -MT        Secretion Management  WNL/WFL  -MT        Mucosal Quality  moist, healthy  -MT           Oral Musculature and Cranial Nerve Assessment    Oral Motor General Assessment  WFL  -MT           General Eating/Swallowing Observations    Respiratory Support Currently in Use  room air  -MT        Eating/Swallowing Skills  self-fed;fed by SLP;appropriate self-feeding  skills observed  -MT        Positioning During Eating  upright in bed  -MT        Utensils Used  spoon;cup;straw  -MT        Consistencies Trialed  regular textures;soft textures;pureed;thin liquids  -MT           Clinical Swallow Eval    Clinical Swallow Evaluation Summary  Patient has baseline soft voice, no impairments noted, voice clear and no signs of aspiration  -MT           Clinical Impression    SLP Swallowing Diagnosis  functional oral phase;functional pharyngeal phase  -MT        Functional Impact  no impact on function  -MT        Rehab Potential/Prognosis, Swallowing  good, to achieve stated therapy goals  -MT        Swallow Criteria for Skilled Therapeutic Interventions Met  no problems identified which require skilled intervention  -MT           Recommendations    Therapy Frequency (Swallow)  evaluation only  -MT        SLP Diet Recommendation  regular textures;thin liquids  -MT        Recommended Precautions and Strategies  upright posture during/after eating  -MT        SLP Rec. for Method of Medication Administration  meds whole;with thin liquids  -MT        Monitor for Signs of Aspiration  yes;notify SLP if any concerns  -MT        Anticipated Dischage Disposition  home  -MT          User Key  (r) = Recorded By, (t) = Taken By, (c) = Cosigned By    Initials Name Effective Dates    MT Gena Bejarano MS CCC-SLP 06/08/18 -           EDUCATION  The patient has been educated in the following areas:   Cognitive Impairment Communication Impairment Dysphagia (Swallowing Impairment) NPO rationale and notify OP SLP if any deficits noted post-discharge.    SLP Recommendation and Plan  SLP Swallowing Diagnosis: functional oral phase, functional pharyngeal phase  SLP Diet Recommendation: regular textures, thin liquids     Monitor for Signs of Aspiration: yes, notify SLP if any concerns     Swallow Criteria for Skilled Therapeutic Interventions Met: no problems identified which require skilled  intervention  Anticipated Dischage Disposition: home  Rehab Potential/Prognosis, Swallowing: good, to achieve stated therapy goals  Therapy Frequency (Swallow): evaluation only     Plan of Care Reviewed With: patient, spouse  Progress: improving  Outcome Summary: Patient improving, swallow function assessed and is within normal limits. Patient reports no speech-language impairment at this time, and none noted during this evaluation. Recommend regular diet, advised patient to notify Speech department if he notes any impairment post-discharge. Speech to sign off at this time.           SLP Outcome Measures (last 72 hours)      SLP Outcome Measures     Row Name 04/15/19 1100             SLP Outcome Measures    Outcome Measure Used?  Adult NOMS  -MT         Adult FCM Scores    FCM Chosen  Swallowing  -MT      Swallowing FCM Score  7  -MT        User Key  (r) = Recorded By, (t) = Taken By, (c) = Cosigned By    Initials Name Effective Dates    Gena Wang MS CCC-SLP 06/08/18 -            Time Calculation:   Time Calculation- SLP     Row Name 04/15/19 1121             Time Calculation- SLP    SLP Start Time  1045  -MT      SLP Stop Time  1121  -MT      SLP Time Calculation (min)  36 min  -MT      SLP Received On  04/15/19  -MT        User Key  (r) = Recorded By, (t) = Taken By, (c) = Cosigned By    Initials Name Provider Type    Gena Wang MS CCC-SLP Speech and Language Pathologist          Therapy Charges for Today     Code Description Service Date Service Provider Modifiers Qty    08500420806 HC ST EVAL ORAL PHARYNG SWALLOW 2 4/15/2019 Gena Bejarano MS CCC-SLP GN 1               SLP Discharge Summary  Anticipated Dischage Disposition: home  Reason for Discharge: all goals and outcomes met, no further needs identified  Progress Toward Achieving Short/long Term Goals: all goals met within established timelines  Discharge Destination: home    Gena Bejarano MS CCC-SLP  4/15/2019

## 2019-04-15 NOTE — PLAN OF CARE
"Problem: Patient Care Overview  Goal: Plan of Care Review  Outcome: Ongoing (interventions implemented as appropriate)   04/15/19 1321   Coping/Psychosocial   Plan of Care Reviewed With patient   OTHER   Outcome Summary Pt seen today by PT for up to chair and \"amb inside room\" at RN request due to TPN restriction lifted per Neuro/Dr Mckeon. BUE/BLE arom and strength observed as symmetrical and >3/5 while amb 15' to chair. Pt does not appear unsteady at this time. Pt reports visual deficit has \"cleared up\". Anticipate pt at baseline tomorrow without further need for cont PT . Advise pt to be seen 1-2 additional sessions to make sure his vision is not causing any issues with balance or bumping into objects.          "

## 2019-04-15 NOTE — PLAN OF CARE
Problem: Patient Care Overview  Goal: Plan of Care Review   04/15/19 9776   Coping/Psychosocial   Plan of Care Reviewed With patient   OTHER   Outcome Summary Pt admitted to [facility] s/p CVA. Pt very pleasant and willing to work with OT. Pt demonstrate all ALDs with supervision/set-up including LED, grooming, ambulating to bathroom and performing toilet transfer without need for AD. Pt c/o double vision in past however said it has subsided. OT will follow up with PT to make sure vision cont to be OK and no effect ADLs.

## 2019-04-15 NOTE — PROGRESS NOTES
Clinical Pharmacy Services: Medication History    Neeraj Frazier is a 59 y.o. male presenting to [facility] for CVA (cerebral vascular accident) (CMS/[facility]) [I63.9]    He  has a past medical history of Hyperlipemia and Hypertension.    Allergies as of 04/14/2019   • (No Known Allergies)       Medication information was obtained from: [facility]  Pharmacy and Phone Number:  [email or web address]  [address] [place]  Phone: [phone]    Prior to Admission Medications     Prescriptions Last Dose Informant Patient Reported? Taking?    amLODIPine (NORVASC) 2.5 MG tablet  Pharmacy Yes Yes    Take 2.5 mg by mouth Daily.    lisinopril (PRINIVIL,ZESTRIL) 20 MG tablet  Pharmacy Yes Yes    Take 20 mg by mouth Daily.    simvastatin (ZOCOR) 20 MG tablet  Pharmacy Yes Yes    Take 20 mg by mouth Every Night.            Medication notes: Patient reports good compliance, remembered the names of most meds. Had to verify with pharmacy on amlodipine.    This medication list is complete to the best of my knowledge as of 4/15/2019    Please call if questions.    Cr Haque PharmD Candidate  4/15/2019 11:43 AM

## 2019-04-15 NOTE — THERAPY EVALUATION
Acute Care - Physical Therapy Initial Evaluation  [facility]     Patient Name: Neeraj Frazier  : 1960  MRN: [id number]  Today's Date: 4/15/2019   Onset of Illness/Injury or Date of Surgery: 19  Date of Referral to PT: 19  Referring Physician: Yudi      Admit Date: 2019    Visit Dx: No diagnosis found.  Patient Active Problem List   Diagnosis   • CVA (cerebral vascular accident) (CMS/HCC)     Past Medical History:   Diagnosis Date   • Hyperlipemia    • Hypertension      History reviewed. No pertinent surgical history.     PT ASSESSMENT (last 12 hours)      Physical Therapy Evaluation     Row Name 04/15/19 1145          PT Evaluation Time/Intention    Subjective Information  no complaints  -SV     Document Type  evaluation  -SV     Mode of Treatment  individual therapy;physical therapy  -SV     Patient Effort  excellent  -SV     Symptoms Noted During/After Treatment  none  -SV     Comment  RN reports ok to complete PT eval per Neuro: RN request stay in room today TPN 24 hour expires 18:07  -SV     Row Name 04/15/19 1145          General Information    Patient Profile Reviewed?  yes  -SV     Onset of Illness/Injury or Date of Surgery  19  -SV     Referring Physician  Yudi  -SV     Patient Observations  alert;cooperative;agree to therapy  -SV     Patient/Family Observations  wife present  -SV     General Observations of Patient  agreed to uic, anxious to get oob today, ICU/monitors  -SV     Prior Level of Function  independent:  -SV     Equipment Currently Used at Home  none  -SV     Pertinent History of Current Functional Problem  Pt performing yardwork yesterday when CVA symptoms onset of blurred vision and right weakness. Pt admit dx with LICA dissection with associated thrombus and associated narrowing, acute embolic CVA left MCA s/p TPA.   -SV     Row Name 04/15/19 1145          Resource/Environmental Concerns    Current Living Arrangements  home/apartment/condo  -SV      Resource/Environmental Concerns  none  -SV     Row Name 04/15/19 1145          Cognitive Assessment/Intervention- PT/OT    Orientation Status (Cognition)  oriented x 4  -SV     Follows Commands (Cognition)  WFL  -SV     Safety Deficit (Cognitive)  awareness of need for assistance  -     Row Name 04/15/19 1145          Bed Mobility Assessment/Treatment    Bed Mobility Assessment/Treatment  supine-sit  -SV     Supine-Sit [place] (Bed Mobility)  supervision  -     Row Name 04/15/19 1145          Transfer Assessment/Treatment    Transfer Assessment/Treatment  sit-stand transfer;stand-sit transfer  -SV     Sit-Stand [place] (Transfers)  contact guard;stand by assist  -     Stand-Sit [place] (Transfers)  stand by assist  -     Row Name 04/15/19 1145          Gait/Stairs Assessment/Training    [place] Level (Gait)  contact guard;1 person to manage equipment;stand by assist  -     Assistive Device (Gait)  -- No AD  -SV     Distance in Feet (Gait)  -- 15 around bed to chair at nsg request  -     Row Name 04/15/19 1145          General ROM    GENERAL ROM COMMENTS  BUE/BLE arom appear wfl with symmetrical coordination(wfl)  -     Row Name 04/15/19 1145          MMT (Manual Muscle Testing)    General MMT Comments  Observed BUE/BLE gross strength at >3/5   -     Row Name 04/15/19 1145          Vision Assessment/Intervention    Vision Assessment Comment  peripheral vision appears intact on the right as per pt subjective with left eye blocked  -     Row Name 04/15/19 1145          Pain Assessment    Additional Documentation  -- no signs of pain  -     Row Name 04/15/19 1145          Plan of Care Review    Plan of Care Reviewed With  patient;spouse  -[facility] Name 04/15/19 1145          Physical Therapy Clinical Impression    Date of Referral to PT  04/14/19  -SV     Functional Level at Time of Evaluation (PT Clinical Impression)  sba/cga amb inside room  -     Patient/Family Goals  "Statement (PT Clinical Impression)  Anticipate indep amb tomorrow without deficits once restricitons cleared  -SV     Impairments Found (describe specific impairments)  -- balance and gait not fully tested today  -SV     Rehab Potential (PT Clinical Summary)  other (see comments) Pt no lob or guarding today with oob and amb 15' to chair  -SV     Row Name 04/15/19 1145          Physical Therapy Goals    Gait Training Goal Selection (PT)  gait training, PT goal 1  -SV     Row Name 04/15/19 1145          Gait Training Goal 1 (PT)    Activity/Assistive Device (Gait Training Goal 1, PT)  gait (walking locomotion)  -SV     [place] Level (Gait Training Goal 1, PT)  independent  -SV     Distance (Gait Goal 1, PT)  300  -SV     Time Frame (Gait Training Goal 1, PT)  by discharge  -     Row Name 04/15/19 1145          Positioning and Restraints    Pre-Treatment Position  in bed  -SV     Post Treatment Position  chair  -SV     In Chair  notified nsg;sitting;call light within reach;encouraged to call for assist;with family/caregiver  -SV       User Key  (r) = Recorded By, (t) = Taken By, (c) = Cosigned By    Initials Name Provider Type    SV Yodit Hsu, PT Physical Therapist        Physical Therapy Education     Title: PT OT SLP Therapies (Done)     Topic: Physical Therapy (Done)     Point: Mobility training (Done)     Learning Progress Summary           Patient Acceptance, E,TB,D, VU,NR by  at 4/15/2019  1:20 PM                               User Key     Initials Effective Dates Name Provider Type Discipline     04/03/18 -  Yodit Hsu, PT Physical Therapist PT              PT Recommendation and Plan  Planned Therapy Interventions (PT Eval): balance training, gait training  Plan of Care Reviewed With: patient  Outcome Summary: Pt seen today by PT for up to chair and \"amb inside room\" at RN request due to TPN restriction lifted per Neuro/Dr Mckeon.   BUE/BLE arom and strength observed as symmetrical and " ">3/5 while amb 15' to chair. Pt does not appear unsteady at this time.  Pt reports visual deficit has \"cleared up\". Anticipate pt at baseline tomorrow without further need for cont PT . Advise pt to be seen 1-2 additional sessions to make sure his vision is not causing any issues with balance or bumping into objects.   Outcome Measures     Row Name 04/15/19 1300             How much help from another person do you currently need...    Turning from your back to your side while in flat bed without using bedrails?  4  -SV      Moving from lying on back to sitting on the side of a flat bed without bedrails?  4  -SV      Moving to and from a bed to a chair (including a wheelchair)?  3  -SV      Standing up from a chair using your arms (e.g., wheelchair, bedside chair)?  3  -SV      Climbing 3-5 steps with a railing?  3  -SV      To walk in hospital room?  3  -SV      AM-PAC 6 Clicks Score  20  -SV         Functional Assessment    Outcome Measure Options  AM-PAC 6 Clicks Basic Mobility (PT)  -SV        User Key  (r) = Recorded By, (t) = Taken By, (c) = Cosigned By    Initials Name Provider Type    SV Yodit Hsu, PT Physical Therapist         Time Calculation:   PT Charges     Row Name 04/15/19 1326             Time Calculation    Start Time  1145  -SV      Stop Time  1200  -SV      Time Calculation (min)  15 min  -SV      PT Received On  04/15/19  -SV      PT - Next Appointment  04/16/19  -      PT Goal Re-Cert Due Date  04/22/19  -        User Key  (r) = Recorded By, (t) = Taken By, (c) = Cosigned By    Initials Name Provider Type    SV Yodit Hsu, PT Physical Therapist        Therapy Charges for Today     Code Description Service Date Service Provider Modifiers Qty    38094417564 HC PT EVAL LOW COMPLEXITY 1 4/15/2019 Yodit Hsu, PT GP 1          PT G-Codes  Outcome Measure Options: AM-PAC 6 Clicks Basic Mobility (PT)  AM-PAC 6 Clicks Score: 20      Yodit Hsu PT  4/15/2019       "

## 2019-04-15 NOTE — THERAPY EVALUATION
"Acute Care - Occupational Therapy Initial Evaluation  [facility]     Patient Name: Neeraj Frazier  : 1960  MRN: [id number]  Today's Date: 4/15/2019  Onset of Illness/Injury or Date of Surgery: 19  Date of Referral to OT: 19  Referring Physician: Dr. Bagley     Admit Date: 2019     No diagnosis found.  Patient Active Problem List   Diagnosis   • CVA (cerebral vascular accident) (CMS/HCC)     Past Medical History:   Diagnosis Date   • Hyperlipemia    • Hypertension      History reviewed. No pertinent surgical history.       OT ASSESSMENT FLOWSHEET (last 12 hours)      Occupational Therapy Evaluation     Row Name 04/15/19 1400                   OT Evaluation Time/Intention    Subjective Information  no complaints  -SK        Document Type  evaluation  -SK        Mode of Treatment  occupational therapy  -SK        Patient Effort  excellent  -SK        Symptoms Noted During/After Treatment  none  -SK        Comment  Pt motivated today, pt ambulate in room w/o need of wx  -SK           General Information    Patient Profile Reviewed?  yes  -SK        Onset of Illness/Injury or Date of Surgery  19  -SK        Referring Physician  Dr. Bagley   -SK        Patient Observations  alert;cooperative;agree to therapy  -SK        Patient/Family Observations  \"I don't think I need therapy.  I can dress myself and get on the toilet by myself, I will show you.\"  -SK        General Observations of Patient  Pt sitting up in chair when OT arrived,  no signs of distress noted   -SK        Prior Level of Function  independent:  -SK        Equipment Currently Used at Home  none  -SK        Risks Reviewed  patient:;spouse/S.O.:  -SK        Benefits Reviewed  patient:  -SK        Barriers to Rehab  none identified  -SK           Cognitive Assessment/Intervention- PT/OT    Orientation Status (Cognition)  oriented x 4  -SK        Follows Commands (Cognition)  WFL  -SK           Safety Issues, Functional " Mobility    Comment, Safety Issues/Impairments (Mobility)  none noted   -SK           Bed Mobility Assessment/Treatment    Bed Mobility Assessment/Treatment  supine-sit;sit-supine  -SK           Functional Mobility    Functional Mobility- Ind. Level  supervision required  -SK        Functional Mobility- Device  -- none  -SK        Functional Mobility-Distance (Feet)  20 walk to and from restroom   -SK        Functional Mobility- Comment  no signs of LOB noted   -SK           Transfer Assessment/Treatment    Transfer Assessment/Treatment  sit-stand transfer;stand-sit transfer;toilet transfer  -SK           Sit-Stand Transfer    Sit-Stand [place] (Transfers)  stand by assist  -SK           Stand-Sit Transfer    Stand-Sit [place] (Transfers)  stand by assist  -SK           Toilet Transfer    Type (Toilet Transfer)  sit-stand;stand-sit  -SK        [place] Level (Toilet Transfer)  stand by assist  -SK        Assistive Device (Toilet Transfer)  -- pt pushed own poll to and from bathroom   -SK           ADL Assessment/Intervention    BADL Assessment/Intervention  bathing;upper body dressing;lower body dressing;grooming;toileting  -SK           Lower Body Dressing Assessment/Training    Lower Body Dressing [place] Level  doff;don;socks;set up  -SK        Lower Body Dressing Position  unsupported sitting  -SK           Grooming Assessment/Training    [place] Level (Grooming)  set up  -SK        Grooming Position  unsupported standing  -SK           General ROM    GENERAL ROM COMMENTS  WFL  -SK           MMT (Manual Muscle Testing)    General MMT Comments  grossly 4/5   -SK           Sensory Assessment/Intervention    Sensory General Assessment  no sensation deficits identified  -SK           Vision Assessment/Intervention    Vision Assessment Comment  Pt previously c/o double vision  however states that it has subsided, pt track in all planes   -SK           Positioning and Restraints     Pre-Treatment Position  sitting in chair/recliner  -SK        Post Treatment Position  chair  -SK        In Chair  with family/caregiver;encouraged to call for assist;call light within reach;reclined  -SK           Pain Assessment    Additional Documentation  Pain Scale: Numbers Pre/Post-Treatment (Group)  -SK           Pain Scale: Numbers Pre/Post-Treatment    Pain Scale: Numbers, Pretreatment  0/10 - no pain  -SK        Pain Scale: Numbers, Post-Treatment  0/10 - no pain  -SK           Plan of Care Review    Plan of Care Reviewed With  patient  -SK           Clinical Impression (OT)    Date of Referral to OT  04/14/19  -SK        OT Diagnosis  Acute ischemic R MCA Stroke resulting in double vision, decreased strength, endurance.    -SK        Prognosis (OT Eval)  Pt will benefit from skilled OT to address deficits, to keep a watch on vision and make sure it does not effect ADLs.  Pt expected to do well and not need further OT upon d/c   -SK        Functional Level at Time of Evaluation (OT Eval)  SBA/supervision for all ADLs  -SK        Patient/Family Goals Statement (OT Eval)  to go home   -SK        Criteria for Skilled Therapeutic Interventions Met (OT Eval)  yes  -SK        Rehab Potential (OT Eval)  good, to achieve stated therapy goals  -SK        Therapy Frequency (OT Eval)  5 times/wk  -SK        Care Plan Review (OT)  evaluation/treatment results reviewed;care plan/treatment goals reviewed;patient/other agree to care plan  -SK        Anticipated Discharge Disposition (OT)  home  -SK           Planned OT Interventions    Planned Therapy Interventions (OT Eval)  activity tolerance training;BADL retraining;transfer/mobility retraining  -SK           OT Goals    Transfer Goal Selection (OT)  transfer, OT goal 1  -SK        Grooming Goal Selection (OT)  grooming, OT goal 1  -SK        Activity Tolerance Goal Selection (OT)  activity tolerance, OT goal 1  -SK        Additional Documentation  Activity Tolerance  Goal Selection (OT) (Row);Grooming Goal Selection (OT) (Row)  -SK           Transfer Goal 1 (OT)    Activity/Assistive Device (Transfer Goal 1, OT)  toilet  -SK        [place] Level/Cues Needed (Transfer Goal 1, OT)  conditional independence  -SK        Time Frame (Transfer Goal 1, OT)  1 week  -SK        Progress/Outcome (Transfer Goal 1, OT)  goal ongoing  -SK           Grooming Goal 1 (OT)    Activity/Device (Grooming Goal 1, OT)  grooming skills, all  -SK        [place] (Grooming Goal 1, OT)  conditional independence  -SK        Time Frame (Grooming Goal 1, OT)  1 week  -SK        Progress/Outcome (Grooming Goal 1, OT)  goal ongoing  -SK            Activity Tolerance Goal 1 (OT)    Activity Tolerance Goal 1 (OT)  engage in upright  ADLs for 10 min without rest break in prep for performing ADLs at home   -SK        Activity Level (Endurance Goal 1, OT)  10 min activity  -SK        Time Frame (Activity Tolerance Goal 1, OT)  1 week  -SK        Progress/Outcome (Activity Tolerance Goal 1, OT)  goal ongoing  -SK          User Key  (r) = Recorded By, (t) = Taken By, (c) = Cosigned By    Initials Name Effective Dates    SK Hilary Bradley OT 02/25/19 -          Occupational Therapy Education     Title: PT OT SLP Therapies (In Progress)     Topic: Occupational Therapy (In Progress)     Point: ADL training (Done)     Description: Instruct learner(s) on proper safety adaptation and remediation techniques during self care or transfers.   Instruct in proper use of assistive devices.    Learning Progress Summary           Patient Acceptance, FROYLAN SANCHEZDU by SK at 4/15/2019  3:06 PM    Comment:  discussed transfers and safety,  Pt demo understanding with transfer to toilet                               User Key     Initials Effective Dates Name Provider Type Discipline    SK 02/25/19 -  Hialry Bradley, OT Occupational Therapist OT                  OT Recommendation and Plan  Outcome Summary/Treatment Plan  (OT)  Anticipated Discharge Disposition (OT): home  Planned Therapy Interventions (OT Eval): activity tolerance training, BADL retraining, transfer/mobility retraining  Therapy Frequency (OT Eval): 5 times/wk  Plan of Care Review  Plan of Care Reviewed With: patient  Plan of Care Reviewed With: patient  Outcome Summary: Pt admitted to [facility] s/p CVA.  Pt very pleasant and willing to work with OT.  Pt demonstrate all ALDs with supervision/set-up including LED, grooming, ambulating to bathroom and performing toilet transfer without need for AD.  Pt c/o double vision in past however said it has subsided.  OT will follow up with PT to make sure vision cont to be OK and no effect ADLs.     Outcome Measures     Row Name 04/15/19 1500 04/15/19 1300          How much help from another person do you currently need...    Turning from your back to your side while in flat bed without using bedrails?  --  4  -SV     Moving from lying on back to sitting on the side of a flat bed without bedrails?  --  4  -SV     Moving to and from a bed to a chair (including a wheelchair)?  --  3  -SV     Standing up from a chair using your arms (e.g., wheelchair, bedside chair)?  --  3  -SV     Climbing 3-5 steps with a railing?  --  3  -SV     To walk in hospital room?  --  3  -SV     AM-PAC 6 Clicks Score  --  20  -SV        How much help from another is currently needed...    Putting on and taking off regular lower body clothing?  3  -SK  --     Bathing (including washing, rinsing, and drying)  3  -SK  --     Toileting (which includes using toilet bed pan or urinal)  3  -SK  --     Putting on and taking off regular upper body clothing  4  -SK  --     Taking care of personal grooming (such as brushing teeth)  4  -SK  --     Eating meals  4  -SK  --     Score  21  -SK  --        Modified [place] Scale    Pre-Stroke Modified [place] Scale  0 - No Symptoms at all.  -SK  --     Modified [place] Scale  2 - Slight disability.  Unable to carry out all  previous activities but able to look after own affairs without assistance.  -SK  --        Functional Assessment    Outcome Measure Options  AM-PAC 6 Clicks Daily Activity (OT);Modified [place]  -SK  AM-PAC 6 Clicks Basic Mobility (PT)  -SV       User Key  (r) = Recorded By, (t) = Taken By, (c) = Cosigned By    Initials Name Provider Type    SV Yodit Hsu, PT Physical Therapist    SK Hilary Bradley OT Occupational Therapist          Time Calculation:   Time Calculation- OT     Row Name 04/15/19 1510             Time Calculation- OT    OT Start Time  1330  -SK      OT Stop Time  1353  -SK      OT Time Calculation (min)  23 min  -SK      Total Timed Code Minutes- OT  12 minute(s)  -SK        User Key  (r) = Recorded By, (t) = Taken By, (c) = Cosigned By    Initials Name Provider Type    Hilary Troy OT Occupational Therapist        Therapy Charges for Today     Code Description Service Date Service Provider Modifiers Qty    [id number]  OT EVAL MOD COMPLEXITY 2 4/15/2019 Hilary Bradley OT GO 1    [id number]  OT SELF CARE/MGMT/TRAIN EA 15 MIN 4/15/2019 Hilary Bradley OT GO 1               Hilary Bradley OT  4/15/2019

## 2019-04-15 NOTE — PLAN OF CARE
Problem: Patient Care Overview  Goal: Plan of Care Review  Outcome: Ongoing (interventions implemented as appropriate)   04/15/19 1758   Coping/Psychosocial   Plan of Care Reviewed With patient;spouse   OTHER   Outcome Summary Pt had no neuro changes overnight. Heparin drip running. Visual field and numbness seem to be improving per patient. Will continue to monitor per orders.

## 2019-04-15 NOTE — PROGRESS NOTES
LOS: 1 day   Patient Care Team:  Destini Castillo APRN as PCP - General (Nurse Practitioner)    Subjective     Patient notes he is doing very well he has a mild headache he just got some Tylenol and that is better.  He notes his vision and his right hand strength and function is much improved    Review of Systems:   He does drink alcohol 12-24 ounces per day he is never had any alcohol withdrawal or alcohol problems.  He does not use illicit drugs or smoke tobacco       Objective     Vital Signs  Vital Sign Min/Max for last 24 hours  Temp  Min: 97.9 °F (36.6 °C)  Max: 99 °F (37.2 °C)   BP  Min: 134/96  Max: 174/94   Pulse  Min: 68  Max: 100   Resp  Min: 18  Max: 18   SpO2  Min: 95 %  Max: 100 %   No Data Recorded   Weight  Min: 80.4 kg (177 lb 4 oz)  Max: 81.2 kg (179 lb 0.2 oz)        Ventilator/Non-Invasive Ventilation Settings (From admission, onward)    None                       Body mass index is 24.04 kg/m².  I/O last 3 completed shifts:  In: 1030 [I.V.:1030]  Out: 1550 [Urine:1550]  No intake/output data recorded.        Physical Exam:  General Appearance: Well-developed white male resting in bed does not appear in any acute distress  Eyes: Conjunctiva are clear and anicteric pupils are equal and reactive to light, extraocular muscles are intact, I cannot detect any visual field defect to confrontation today.  ENT: Mucous membranes are moist no erythema no exudates, no facial asymmetry tongue is midline, speech is fluent  Neck: No adenopathy no thyromegaly no jugular venous distention, trachea midline  Lungs: Clear nonlabored symmetric expansion no wheezes rales or rhonchi  Cardiac: Regular rate rhythm no murmur no gallop  Abdomen: Soft nontender no palpable organomegaly or masses  : Not examined  Musculoskeletal: Grossly normal  Skin: He has a superficial abrasion on his right pretibial region no evidence of infection erythema or drainage.  No jaundice, no petechiae  Neuro: He is alert and  oriented x3 he is following commands well in fact cranial nerves II through XII are intact to formal exam.  There is mild right pronator drift.  Good  strength finger to nose to finger without difficulty.  Heel to shin without difficulty no difficulties with straight leg raise dorsiflexion or plantarflexion of the feet.  Extremities/P Vascular: No clubbing no cyanosis no edema palpable radial and dorsalis pedis pulses bilaterally  MSE: He seems a little depressed       Labs:      CrCl cannot be calculated (No order found.).      Results from last 7 days   Lab Units 04/15/19  0338 04/14/19 2000   WBC 10*3/mm3 7.53 7.42   RBC 10*6/mm3 4.34 4.59   HEMOGLOBIN g/dL 13.9 14.7   HEMATOCRIT % 40.8 42.8   MCV fL 94.0 93.2   MCH pg 32.0 32.0   MCHC g/dL 34.1 34.3   RDW % 12.6 12.4   RDW-SD fl 43.6 42.5   MPV fL 9.8 9.6   PLATELETS 10*3/mm3 223 251   NEUTROPHIL % % 62.2 79.1*   LYMPHOCYTE % % 29.5 15.1*   MONOCYTES % % 7.3 4.7*   EOSINOPHIL % % 0.4 0.1*   BASOPHIL % % 0.5 0.5   IMM GRAN % % 0.1 0.5   NEUTROS ABS 10*3/mm3 4.68 5.86   LYMPHS ABS 10*3/mm3 2.22 1.12   MONOS ABS 10*3/mm3 0.55 0.35   EOS ABS 10*3/mm3 0.03 0.01   BASOS ABS 10*3/mm3 0.04 0.04   IMMATURE GRANS (ABS) 10*3/mm3 0.01 0.04   NRBC /100 WBC 0.0 0.0                         Results from last 7 days   Lab Units 04/14/19 2000   INR  1.12*     Microbiology Results (last 10 days)     ** No results found for the last 240 hours. **                aspirin 81 mg Oral Daily   Or      aspirin 300 mg Rectal Daily   atorvastatin 80 mg Oral Nightly   sodium chloride 3 mL Intravenous Q12H       clevidipine 2-32 mg/hr    heparin (porcine) 12 Units/kg/hr Last Rate: 15 Units/kg/hr (04/15/19 0404)   sodium chloride 75 mL/hr Last Rate: 100 mL/hr (04/15/19 0402)       Diagnostics:  Ct Angiogram Head With & Without Contrast    Result Date: 4/15/2019  CT HEAD WITHOUT CONTRAST, CT ANGIOGRAM HEAD AND NECK WITH IV CONTRAST, CT CEREBRAL PERFUSION EXAM WITH AND WITHOUT CONTRAST.   TECHNIQUE: Head CT as obtained without IV contrast. CT angiogram of the head and neck was obtained with IV contrast in the arterial phase and this data was reconstructed in coronal and sagittal planes. 3-D volume rendering was performed. CT cerebral perfusion exam was obtained with and without IV contrast.  FINDINGS: Noncontrasted head CT demonstrates no abnormal area of increased attenuation tracks to suggest hemorrhage. No extra-axial fluid collection is observed. There is no mass effect or shift of midline structures.  No extraaxial fluid collection is evident. Ventricles appear within normal limits for size and configuration.  Within the distal cervical internal carotid artery there is a linear internal filling defect that begins at the horizontal level of the C1 ring. Linear, irregular filling defect extends cephalad through the petrous segment to the junction of the petrous and cavernous segments of the left ICA. This linear filling defect has the appearance of a dissection associated with areas of thrombus up to moderate stenosis without complete stenosis or occlusion. The cavernous and supracavernous left ICA is patent.  There is atherosclerotic calcified plaque involving the carotid bulbs and both ICA origins without NASCET stenosis of the proximal ICAs or carotid bulbs. Both common carotid arteries are patent. The right cervical ICA is patent. The right petrous and cavernous and supracavernous ICA is patent. Both ACAs and MCAs and major branches appear patent.  The right vertebral artery is larger than the left. There is flow within both vertebral arteries. The left vertebral artery terminates in the left PICA. There is flow within the intracranial right vertebral artery. Basilar artery is somewhat small. There is a fetal origin of the left posterior cerebral artery. Both posterior cerebral arteries are patent.  CT cerebral perfusion exam with Rapid software demonstrates 0 mL where there is CBF less than  30% volume and 0 mL where there is Tmax greater than 6 seconds. With a cut off Tmax greater than 4 seconds, there is a slight delayed perfusion to the region of the left parietal lobe and to lesser degree within the right frontal lobe. The postcontrast enhanced head CT demonstrates no abnormal intracranial enhancement. Within the medial left maxillary sinus there is an 8 mm mucous retention cyst.      1. Linear intraluminal filling defect within the distal left cervical and petrous segments of the left internal carotid artery begins at the horizontal level of the C1 ring and extends to the junction of the petrous and cavernous segments.   The appearance is consistent with a dissection with associated thrombus and is associated with moderate narrowing. 2. CT cerebral perfusion exam demonstrates no focal areas where there is CBF less than 30% volume or Tmax greater than 6 seconds. Noncontrasted head CT demonstrates no evidence for intracranial hemorrhage or acute abnormality. MRI evaluation may be helpful to evaluate acute stroke in this patient with right-sided weakness.  Findings of a noncontrast exam discussed with Dr. Bagley while the patient was on the CT table at 1740. Findings of a contrast exam discussed with Dr. Bagley at 1815 p.m.  This report was finalized on 4/15/2019 7:20 AM by Dr. Francois Perry M.D.      Ct Head Without Contrast    Result Date: 4/14/2019  CRANIAL CT SCAN WITHOUT CONTRAST  CLINICAL HISTORY: Stroke  COMPARISON: 4:35 PM, outside exam.  TECHNIQUE: Radiation dose reduction techniques were utilized, including automated exposure control and exposure modulation based on body size. Multiple axial images of the head were obtained without contrast.  FINDINGS:  There is residual iodinated contrast from prior CT angiogram. No acute hemorrhage is appreciated considering the presence of residual contrast. There is no mass effect or midline shift identified. Ventricles, sulci, and cisterns appear  normal. Bone window images show chronic appearing paranasal sinus disease, greatest in the left maxillary.         1. Residual iodinated contrast is noted, no acute intracranial finding or CT evidence of acute CVA.      This report was finalized on 4/14/2019 10:21 PM by Luis E Peña M.D.      Ct Angiogram Neck With & Without Contrast    Result Date: 4/15/2019  CT HEAD WITHOUT CONTRAST, CT ANGIOGRAM HEAD AND NECK WITH IV CONTRAST, CT CEREBRAL PERFUSION EXAM WITH AND WITHOUT CONTRAST.  TECHNIQUE: Head CT as obtained without IV contrast. CT angiogram of the head and neck was obtained with IV contrast in the arterial phase and this data was reconstructed in coronal and sagittal planes. 3-D volume rendering was performed. CT cerebral perfusion exam was obtained with and without IV contrast.  FINDINGS: Noncontrasted head CT demonstrates no abnormal area of increased attenuation tracks to suggest hemorrhage. No extra-axial fluid collection is observed. There is no mass effect or shift of midline structures.  No extraaxial fluid collection is evident. Ventricles appear within normal limits for size and configuration.  Within the distal cervical internal carotid artery there is a linear internal filling defect that begins at the horizontal level of the C1 ring. Linear, irregular filling defect extends cephalad through the petrous segment to the junction of the petrous and cavernous segments of the left ICA. This linear filling defect has the appearance of a dissection associated with areas of thrombus up to moderate stenosis without complete stenosis or occlusion. The cavernous and supracavernous left ICA is patent.  There is atherosclerotic calcified plaque involving the carotid bulbs and both ICA origins without NASCET stenosis of the proximal ICAs or carotid bulbs. Both common carotid arteries are patent. The right cervical ICA is patent. The right petrous and cavernous and supracavernous ICA is patent. Both ACAs and  MCAs and major branches appear patent.  The right vertebral artery is larger than the left. There is flow within both vertebral arteries. The left vertebral artery terminates in the left PICA. There is flow within the intracranial right vertebral artery. Basilar artery is somewhat small. There is a fetal origin of the left posterior cerebral artery. Both posterior cerebral arteries are patent.  CT cerebral perfusion exam with Rapid software demonstrates 0 mL where there is CBF less than 30% volume and 0 mL where there is Tmax greater than 6 seconds. With a cut off Tmax greater than 4 seconds, there is a slight delayed perfusion to the region of the left parietal lobe and to lesser degree within the right frontal lobe. The postcontrast enhanced head CT demonstrates no abnormal intracranial enhancement. Within the medial left maxillary sinus there is an 8 mm mucous retention cyst.      1. Linear intraluminal filling defect within the distal left cervical and petrous segments of the left internal carotid artery begins at the horizontal level of the C1 ring and extends to the junction of the petrous and cavernous segments.   The appearance is consistent with a dissection with associated thrombus and is associated with moderate narrowing. 2. CT cerebral perfusion exam demonstrates no focal areas where there is CBF less than 30% volume or Tmax greater than 6 seconds. Noncontrasted head CT demonstrates no evidence for intracranial hemorrhage or acute abnormality. MRI evaluation may be helpful to evaluate acute stroke in this patient with right-sided weakness.  Findings of a noncontrast exam discussed with Dr. Bagley while the patient was on the CT table at 1740. Findings of a contrast exam discussed with Dr. Bagley at 1815 p.m.  This report was finalized on 4/15/2019 7:20 AM by Dr. Francois Perry M.D.      Ct Cerebral Perfusion With & Without Contrast    Result Date: 4/15/2019  CT HEAD WITHOUT CONTRAST, CT ANGIOGRAM  HEAD AND NECK WITH IV CONTRAST, CT CEREBRAL PERFUSION EXAM WITH AND WITHOUT CONTRAST.  TECHNIQUE: Head CT as obtained without IV contrast. CT angiogram of the head and neck was obtained with IV contrast in the arterial phase and this data was reconstructed in coronal and sagittal planes. 3-D volume rendering was performed. CT cerebral perfusion exam was obtained with and without IV contrast.  FINDINGS: Noncontrasted head CT demonstrates no abnormal area of increased attenuation tracks to suggest hemorrhage. No extra-axial fluid collection is observed. There is no mass effect or shift of midline structures.  No extraaxial fluid collection is evident. Ventricles appear within normal limits for size and configuration.  Within the distal cervical internal carotid artery there is a linear internal filling defect that begins at the horizontal level of the C1 ring. Linear, irregular filling defect extends cephalad through the petrous segment to the junction of the petrous and cavernous segments of the left ICA. This linear filling defect has the appearance of a dissection associated with areas of thrombus up to moderate stenosis without complete stenosis or occlusion. The cavernous and supracavernous left ICA is patent.  There is atherosclerotic calcified plaque involving the carotid bulbs and both ICA origins without NASCET stenosis of the proximal ICAs or carotid bulbs. Both common carotid arteries are patent. The right cervical ICA is patent. The right petrous and cavernous and supracavernous ICA is patent. Both ACAs and MCAs and major branches appear patent.  The right vertebral artery is larger than the left. There is flow within both vertebral arteries. The left vertebral artery terminates in the left PICA. There is flow within the intracranial right vertebral artery. Basilar artery is somewhat small. There is a fetal origin of the left posterior cerebral artery. Both posterior cerebral arteries are patent.  CT  cerebral perfusion exam with Rapid software demonstrates 0 mL where there is CBF less than 30% volume and 0 mL where there is Tmax greater than 6 seconds. With a cut off Tmax greater than 4 seconds, there is a slight delayed perfusion to the region of the left parietal lobe and to lesser degree within the right frontal lobe. The postcontrast enhanced head CT demonstrates no abnormal intracranial enhancement. Within the medial left maxillary sinus there is an 8 mm mucous retention cyst.      1. Linear intraluminal filling defect within the distal left cervical and petrous segments of the left internal carotid artery begins at the horizontal level of the C1 ring and extends to the junction of the petrous and cavernous segments.   The appearance is consistent with a dissection with associated thrombus and is associated with moderate narrowing. 2. CT cerebral perfusion exam demonstrates no focal areas where there is CBF less than 30% volume or Tmax greater than 6 seconds. Noncontrasted head CT demonstrates no evidence for intracranial hemorrhage or acute abnormality. MRI evaluation may be helpful to evaluate acute stroke in this patient with right-sided weakness.  Findings of a noncontrast exam discussed with Dr. Bagley while the patient was on the CT table at 1740. Findings of a contrast exam discussed with Dr. Bagley at 1815 p.m.  This report was finalized on 4/15/2019 7:20 AM by Dr. Francois Perry M.D.               There are no hospital problems to display for this patient.        Assessment/Plan     1. Acute left MCA territory stroke status post TPA management per stroke service  2. Left internal carotid artery dissection  3. Right homonymous hemianopsia secondary to #1 seems to have improved dramatically  4. Right hemiparesis secondary #1 physical and occupational therapy evaluations  5. Poorly controlled hypertension permissive hypertension but trying to keep under 180 systolic.  Eventually lower to goal  blood pressures  6.  dyslipidemia patient is on chronic management levels look good today  7. Headache improving after tylenol  8. Snoring and physical features suggestive of sleep apnea he should have a sleep study as an outpatient  9. Daily alcohol use we will have to watch for any signs of alcohol withdrawal.  At this point his use if he is correct his really quite low I am not going to schedule any benzodiazepines as that may compromise our neurologic exam.    Plan for disposition:    Ousmane Blackburn MD  04/15/19  8:19 AM    Time: Went over 45 minutes on patient's care today

## 2019-04-16 ENCOUNTER — APPOINTMENT (OUTPATIENT)
Dept: CT IMAGING | Facility: HOSPITAL | Age: 59
End: 2019-04-16

## 2019-04-16 LAB
APTT PPP: 111.8 SECONDS (ref 22.7–35.4)
APTT PPP: 46.8 SECONDS (ref 22.7–35.4)
APTT PPP: 80.7 SECONDS (ref 22.7–35.4)
BASOPHILS # BLD AUTO: 0.04 10*3/MM3 (ref 0–0.2)
BASOPHILS NFR BLD AUTO: 0.7 % (ref 0–1.5)
DEPRECATED RDW RBC AUTO: 43.5 FL (ref 37–54)
EOSINOPHIL # BLD AUTO: 0.08 10*3/MM3 (ref 0–0.4)
EOSINOPHIL NFR BLD AUTO: 1.4 % (ref 0.3–6.2)
ERYTHROCYTE [DISTWIDTH] IN BLOOD BY AUTOMATED COUNT: 12.3 % (ref 12.3–15.4)
HCT VFR BLD AUTO: 42.3 % (ref 37.5–51)
HGB BLD-MCNC: 14.2 G/DL (ref 13–17.7)
IMM GRANULOCYTES # BLD AUTO: 0.01 10*3/MM3 (ref 0–0.05)
IMM GRANULOCYTES NFR BLD AUTO: 0.2 % (ref 0–0.5)
LYMPHOCYTES # BLD AUTO: 2.55 10*3/MM3 (ref 0.7–3.1)
LYMPHOCYTES NFR BLD AUTO: 45.4 % (ref 19.6–45.3)
MCH RBC QN AUTO: 31.9 PG (ref 26.6–33)
MCHC RBC AUTO-ENTMCNC: 33.6 G/DL (ref 31.5–35.7)
MCV RBC AUTO: 95.1 FL (ref 79–97)
MONOCYTES # BLD AUTO: 0.37 10*3/MM3 (ref 0.1–0.9)
MONOCYTES NFR BLD AUTO: 6.6 % (ref 5–12)
NEUTROPHILS # BLD AUTO: 2.57 10*3/MM3 (ref 1.4–7)
NEUTROPHILS NFR BLD AUTO: 45.7 % (ref 42.7–76)
NRBC BLD AUTO-RTO: 0 /100 WBC (ref 0–0)
PLATELET # BLD AUTO: 218 10*3/MM3 (ref 140–450)
PMV BLD AUTO: 9.5 FL (ref 6–12)
RBC # BLD AUTO: 4.45 10*6/MM3 (ref 4.14–5.8)
WBC NRBC COR # BLD: 5.62 10*3/MM3 (ref 3.4–10.8)

## 2019-04-16 PROCEDURE — 0 IOPAMIDOL PER 1 ML: Performed by: PSYCHIATRY & NEUROLOGY

## 2019-04-16 PROCEDURE — 85730 THROMBOPLASTIN TIME PARTIAL: CPT | Performed by: INTERNAL MEDICINE

## 2019-04-16 PROCEDURE — 85025 COMPLETE CBC W/AUTO DIFF WBC: CPT | Performed by: PSYCHIATRY & NEUROLOGY

## 2019-04-16 PROCEDURE — 99233 SBSQ HOSP IP/OBS HIGH 50: CPT | Performed by: PSYCHIATRY & NEUROLOGY

## 2019-04-16 PROCEDURE — 97535 SELF CARE MNGMENT TRAINING: CPT | Performed by: OCCUPATIONAL THERAPIST

## 2019-04-16 PROCEDURE — 70496 CT ANGIOGRAPHY HEAD: CPT

## 2019-04-16 PROCEDURE — 0042T HC CT CEREBRAL PERFUSION W/WO CONTRAST: CPT

## 2019-04-16 PROCEDURE — 25010000002 HEPARIN (PORCINE) PER 1000 UNITS: Performed by: PSYCHIATRY & NEUROLOGY

## 2019-04-16 PROCEDURE — 97110 THERAPEUTIC EXERCISES: CPT

## 2019-04-16 PROCEDURE — 94799 UNLISTED PULMONARY SVC/PX: CPT

## 2019-04-16 RX ADMIN — IOPAMIDOL 150 ML: 755 INJECTION, SOLUTION INTRAVENOUS at 09:18

## 2019-04-16 RX ADMIN — Medication 1 TABLET: at 08:12

## 2019-04-16 RX ADMIN — HEPARIN SODIUM 21 UNITS/KG/HR: 10000 INJECTION, SOLUTION INTRAVENOUS at 17:12

## 2019-04-16 RX ADMIN — ASPIRIN 81 MG: 81 TABLET, CHEWABLE ORAL at 08:12

## 2019-04-16 RX ADMIN — ATORVASTATIN CALCIUM 80 MG: 80 TABLET, FILM COATED ORAL at 21:05

## 2019-04-16 RX ADMIN — ACETAMINOPHEN 650 MG: 325 TABLET, FILM COATED ORAL at 18:30

## 2019-04-16 RX ADMIN — HEPARIN SODIUM 21 UNITS/KG/HR: 10000 INJECTION, SOLUTION INTRAVENOUS at 16:31

## 2019-04-16 RX ADMIN — SODIUM CHLORIDE, PRESERVATIVE FREE 3 ML: 5 INJECTION INTRAVENOUS at 08:12

## 2019-04-16 RX ADMIN — SODIUM CHLORIDE 75 ML/HR: 9 INJECTION, SOLUTION INTRAVENOUS at 21:06

## 2019-04-16 RX ADMIN — FOLIC ACID 1 MG: 1 TABLET ORAL at 08:12

## 2019-04-16 RX ADMIN — Medication 100 MG: at 08:12

## 2019-04-16 RX ADMIN — SODIUM CHLORIDE, PRESERVATIVE FREE 3 ML: 5 INJECTION INTRAVENOUS at 21:06

## 2019-04-16 NOTE — PROGRESS NOTES
Discharge Planning Assessment  [facility]     Patient Name: Neeraj Frazier  MRN: [id number]  Today's Date: 4/16/2019    Admit Date: 4/14/2019    Discharge Needs Assessment     Row Name 04/16/19 1046       Living Environment    Lives With  spouse    Name(s) of Who Lives With Patient  Lana Frazier wife [phone]    Current Living Arrangements  home/apartment/condo    Primary Care Provided by  self    Provides Primary Care For  no one    Family Caregiver if Needed  spouse    Family Caregiver Names  Lana sharpe [phone]    Quality of Family Relationships  supportive    Able to Return to Prior Arrangements  yes       Resource/Environmental Concerns    Resource/Environmental Concerns  none    Transportation Concerns  car, none       Transition Planning    Patient/Family Anticipates Transition to  home with family    Patient/Family Anticipated Services at Transition  none    Transportation Anticipated  family or friend will provide       Discharge Needs Assessment    Readmission Within the Last 30 Days  no previous admission in last 30 days    Concerns to be Addressed  no discharge needs identified    Equipment Currently Used at Home  none    Anticipated Changes Related to Illness  none    Equipment Needed After Discharge  none    Offered/Gave Vendor List  no        Discharge Plan     Row Name 04/16/19 1058       Plan    Plan  Return home with family    Patient/Family in Agreement with Plan  yes    Plan Comments  Met with patient at bedside.  Face sheet reviewed and updated accordingly.  Prior to admission patient was independent with all aspects of his ADL's including working outside to the home. Patient does not use any special or adaptive equipment. Patient squints, questioned if he was having visual issues as result of the stroke, he said, no that he needs to get into see an eye MD.  He states that he uses the [facility] pharmacy in [place], denies issues obtaining, affording or taking his medications.   Discharge plans are to return home with family, denies any needs.  Family will transport. Will continue to monitor for new or changing discharge plans.        Destination      No service coordination in this encounter.      Durable Medical Equipment      No service coordination in this encounter.      Dialysis/Infusion      No service coordination in this encounter.      Home Medical Care      No service coordination in this encounter.      Therapy      No service coordination in this encounter.      Community Resources      No service coordination in this encounter.          Demographic Summary     Row Name 04/16/19 1045       Contact Information    Permission Granted to Share Info With  family/designee Lana Frazier wife [phone]    Row Name 04/16/19 1042       General Information    Admission Type  inpatient    Arrived From  emergency department    Referral Source  admission list    Reason for Consult  discharge planning    Preferred Language  English     Used During This Interaction  no       Contact Information    Permission Granted to Share Info With  family/designee        Functional Status     Row Name 04/16/19 1045       Functional Status    Usual Activity Tolerance  excellent    Current Activity Tolerance  good       Functional Status, IADL    Medications  independent       Mental Status    General Appearance WDL  WDL       Mental Status Summary    Recent Changes in Mental Status/Cognitive Functioning  no changes;vision    Mental Status Comments  States needs new glasses       Employment/    Employment Status  self-employed        Psychosocial    No documentation.       Abuse/Neglect    No documentation.       Legal    No documentation.       Substance Abuse    No documentation.       Patient Forms    No documentation.           Apple Pace RN

## 2019-04-16 NOTE — PLAN OF CARE
Problem: Patient Care Overview  Goal: Plan of Care Review   04/16/19 5999   Coping/Psychosocial   Plan of Care Reviewed With patient   OTHER   Outcome Summary Pt able to perform functional mobility at indep level, states no difficulty with R hand and does not want OT. OT will d/c at this time, re-order if needed.   Plan of Care Review   Progress improving

## 2019-04-16 NOTE — PLAN OF CARE
Problem: Thrombolytic Therapy (Adult)  Goal: Signs and Symptoms of Listed Potential Problems Will be Absent, Minimized or Managed (Thrombolytic Therapy)  Outcome: Ongoing (interventions implemented as appropriate)      Problem: Stroke (Ischemic) (Adult)  Goal: Signs and Symptoms of Listed Potential Problems Will be Absent, Minimized or Managed (Stroke)  Outcome: Ongoing (interventions implemented as appropriate)      Problem: Fall Risk (Adult)  Goal: Identify Related Risk Factors and Signs and Symptoms  Outcome: Ongoing (interventions implemented as appropriate)    Goal: Absence of Fall  Outcome: Ongoing (interventions implemented as appropriate)      Problem: Patient Care Overview  Goal: Plan of Care Review  Outcome: Ongoing (interventions implemented as appropriate)   04/16/19 0983   Coping/Psychosocial   Plan of Care Reviewed With patient   OTHER   Outcome Summary Transferrred to floor this evening. NIH-0. A&Ox4. Up in chair. medicated for headache.   Plan of Care Review   Progress no change       Problem: Skin Injury Risk (Adult)  Goal: Identify Related Risk Factors and Signs and Symptoms  Outcome: Ongoing (interventions implemented as appropriate)    Goal: Skin Health and Integrity  Outcome: Ongoing (interventions implemented as appropriate)

## 2019-04-16 NOTE — PROGRESS NOTES
LOS: 2 days   Patient Care Team:  Destini Castillo APRN as PCP - General (Nurse Practitioner)    Subjective     Patient reports he is doing very well no headache today no visual changes he feels that he is pretty normal is been up walking he is just anxious to get out of the hospital.    Review of Systems:   He does drink alcohol 12-24 ounces per day he is never had any alcohol withdrawal or alcohol problems.  He does not use illicit drugs or smoke tobacco       Objective     Vital Signs  Vital Sign Min/Max for last 24 hours  Temp  Min: 98.1 °F (36.7 °C)  Max: 98.8 °F (37.1 °C)   BP  Min: 123/76  Max: 158/86   Pulse  Min: 57  Max: 81   Resp  Min: 16  Max: 16   SpO2  Min: 95 %  Max: 100 %   No Data Recorded   Weight  Min: 80 kg (176 lb 5.9 oz)  Max: 81.4 kg (179 lb 7.3 oz)        Ventilator/Non-Invasive Ventilation Settings (From admission, onward)    None                       Body mass index is 24.57 kg/m².  I/O last 3 completed shifts:  In: 4266 [P.O.:1080; I.V.:3186]  Out: 1300 [Urine:1300]  I/O this shift:  In: 240 [P.O.:240]  Out: -         Physical Exam:  General Appearance: Well-developed white male resting in bed does not appear in any acute distress  Eyes: Conjunctiva are clear and anicteric pupils are equal and reactive to light, extraocular muscles are intact, I cannot detect any visual field defect to confrontation today.  ENT: Mucous membranes are moist no erythema no exudates, no facial asymmetry tongue is midline, speech is fluent  Neck: No adenopathy no thyromegaly no jugular venous distention, trachea midline  Lungs: Clear nonlabored symmetric expansion no wheezes rales or rhonchi  Cardiac: Regular rate rhythm no murmur no gallop  Abdomen: Soft nontender no palpable organomegaly or masses  : Not examined  Musculoskeletal: Grossly normal  Skin: He has a superficial abrasion on his right pretibial region no evidence of infection erythema or drainage.  No jaundice, no petechiae  Neuro: He  is alert and oriented x3 he is following commands well in fact cranial nerves II through XII are intact to formal exam.  There is mild right pronator drift.  Good  strength finger to nose to finger without difficulty.  Heel to shin without difficulty no difficulties with straight leg raise dorsiflexion or plantarflexion of the feet.  Extremities/P Vascular: No clubbing no cyanosis no edema palpable radial and dorsalis pedis pulses bilaterally  MSE: He seems a little depressed       Labs:      CrCl cannot be calculated (No order found.).      Results from last 7 days   Lab Units 04/16/19  0330 04/15/19  0338 04/14/19 2000   WBC 10*3/mm3 5.62 7.53 7.42   RBC 10*6/mm3 4.45 4.34 4.59   HEMOGLOBIN g/dL 14.2 13.9 14.7   HEMATOCRIT % 42.3 40.8 42.8   MCV fL 95.1 94.0 93.2   MCH pg 31.9 32.0 32.0   MCHC g/dL 33.6 34.1 34.3   RDW % 12.3 12.6 12.4   RDW-SD fl 43.5 43.6 42.5   MPV fL 9.5 9.8 9.6   PLATELETS 10*3/mm3 218 223 251   NEUTROPHIL % % 45.7 62.2 79.1*   LYMPHOCYTE % % 45.4* 29.5 15.1*   MONOCYTES % % 6.6 7.3 4.7*   EOSINOPHIL % % 1.4 0.4 0.1*   BASOPHIL % % 0.7 0.5 0.5   IMM GRAN % % 0.2 0.1 0.5   NEUTROS ABS 10*3/mm3 2.57 4.68 5.86   LYMPHS ABS 10*3/mm3 2.55 2.22 1.12   MONOS ABS 10*3/mm3 0.37 0.55 0.35   EOS ABS 10*3/mm3 0.08 0.03 0.01   BASOS ABS 10*3/mm3 0.04 0.04 0.04   IMMATURE GRANS (ABS) 10*3/mm3 0.01 0.01 0.04   NRBC /100 WBC 0.0 0.0 0.0                         Results from last 7 days   Lab Units 04/14/19 2000   INR  1.12*     Microbiology Results (last 10 days)     ** No results found for the last 240 hours. **                aspirin 81 mg Oral Daily   Or      aspirin 300 mg Rectal Daily   atorvastatin 80 mg Oral Nightly   vitamin B-1 100 mg Oral Daily   And      multivitamin 1 tablet Oral Daily   And      folic acid 1 mg Oral Daily   sodium chloride 3 mL Intravenous Q12H       clevidipine 2-32 mg/hr    heparin (porcine) 12 Units/kg/hr Last Rate: 18 Units/kg/hr (04/16/19 0549)   sodium chloride 75  mL/hr Last Rate: 75 mL/hr (04/15/19 1841)       Diagnostics:  Ct Angiogram Head With & Without Contrast    Result Date: 4/15/2019  CT HEAD WITHOUT CONTRAST, CT ANGIOGRAM HEAD AND NECK WITH IV CONTRAST, CT CEREBRAL PERFUSION EXAM WITH AND WITHOUT CONTRAST.  TECHNIQUE: Head CT as obtained without IV contrast. CT angiogram of the head and neck was obtained with IV contrast in the arterial phase and this data was reconstructed in coronal and sagittal planes. 3-D volume rendering was performed. CT cerebral perfusion exam was obtained with and without IV contrast.  FINDINGS: Noncontrasted head CT demonstrates no abnormal area of increased attenuation tracks to suggest hemorrhage. No extra-axial fluid collection is observed. There is no mass effect or shift of midline structures.  No extraaxial fluid collection is evident. Ventricles appear within normal limits for size and configuration.  Within the distal cervical internal carotid artery there is a linear internal filling defect that begins at the horizontal level of the C1 ring. Linear, irregular filling defect extends cephalad through the petrous segment to the junction of the petrous and cavernous segments of the left ICA. This linear filling defect has the appearance of a dissection associated with areas of thrombus up to moderate stenosis without complete stenosis or occlusion. The cavernous and supracavernous left ICA is patent.  There is atherosclerotic calcified plaque involving the carotid bulbs and both ICA origins without NASCET stenosis of the proximal ICAs or carotid bulbs. Both common carotid arteries are patent. The right cervical ICA is patent. The right petrous and cavernous and supracavernous ICA is patent. Both ACAs and MCAs and major branches appear patent.  The right vertebral artery is larger than the left. There is flow within both vertebral arteries. The left vertebral artery terminates in the left PICA. There is flow within the intracranial right  vertebral artery. Basilar artery is somewhat small. There is a fetal origin of the left posterior cerebral artery. Both posterior cerebral arteries are patent.  CT cerebral perfusion exam with Rapid software demonstrates 0 mL where there is CBF less than 30% volume and 0 mL where there is Tmax greater than 6 seconds. With a cut off Tmax greater than 4 seconds, there is a slight delayed perfusion to the region of the left parietal lobe and to lesser degree within the right frontal lobe. The postcontrast enhanced head CT demonstrates no abnormal intracranial enhancement. Within the medial left maxillary sinus there is an 8 mm mucous retention cyst.      1. Linear intraluminal filling defect within the distal left cervical and petrous segments of the left internal carotid artery begins at the horizontal level of the C1 ring and extends to the junction of the petrous and cavernous segments.   The appearance is consistent with a dissection with associated thrombus and is associated with moderate narrowing. 2. CT cerebral perfusion exam demonstrates no focal areas where there is CBF less than 30% volume or Tmax greater than 6 seconds. Noncontrasted head CT demonstrates no evidence for intracranial hemorrhage or acute abnormality. MRI evaluation may be helpful to evaluate acute stroke in this patient with right-sided weakness.  Findings of a noncontrast exam discussed with Dr. Bagley while the patient was on the CT table at 1740. Findings of a contrast exam discussed with Dr. Bagley at 1815 p.m.  This report was finalized on 4/15/2019 7:20 AM by Dr. Francois Perry M.D.      Ct Head Without Contrast    Result Date: 4/14/2019  CRANIAL CT SCAN WITHOUT CONTRAST  CLINICAL HISTORY: Stroke  COMPARISON: 4:35 PM, outside exam.  TECHNIQUE: Radiation dose reduction techniques were utilized, including automated exposure control and exposure modulation based on body size. Multiple axial images of the head were obtained without  contrast.  FINDINGS:  There is residual iodinated contrast from prior CT angiogram. No acute hemorrhage is appreciated considering the presence of residual contrast. There is no mass effect or midline shift identified. Ventricles, sulci, and cisterns appear normal. Bone window images show chronic appearing paranasal sinus disease, greatest in the left maxillary.         1. Residual iodinated contrast is noted, no acute intracranial finding or CT evidence of acute CVA.      This report was finalized on 4/14/2019 10:21 PM by Luis E Peña M.D.      Ct Angiogram Neck With & Without Contrast    Result Date: 4/15/2019  CT HEAD WITHOUT CONTRAST, CT ANGIOGRAM HEAD AND NECK WITH IV CONTRAST, CT CEREBRAL PERFUSION EXAM WITH AND WITHOUT CONTRAST.  TECHNIQUE: Head CT as obtained without IV contrast. CT angiogram of the head and neck was obtained with IV contrast in the arterial phase and this data was reconstructed in coronal and sagittal planes. 3-D volume rendering was performed. CT cerebral perfusion exam was obtained with and without IV contrast.  FINDINGS: Noncontrasted head CT demonstrates no abnormal area of increased attenuation tracks to suggest hemorrhage. No extra-axial fluid collection is observed. There is no mass effect or shift of midline structures.  No extraaxial fluid collection is evident. Ventricles appear within normal limits for size and configuration.  Within the distal cervical internal carotid artery there is a linear internal filling defect that begins at the horizontal level of the C1 ring. Linear, irregular filling defect extends cephalad through the petrous segment to the junction of the petrous and cavernous segments of the left ICA. This linear filling defect has the appearance of a dissection associated with areas of thrombus up to moderate stenosis without complete stenosis or occlusion. The cavernous and supracavernous left ICA is patent.  There is atherosclerotic calcified plaque involving  the carotid bulbs and both ICA origins without NASCET stenosis of the proximal ICAs or carotid bulbs. Both common carotid arteries are patent. The right cervical ICA is patent. The right petrous and cavernous and supracavernous ICA is patent. Both ACAs and MCAs and major branches appear patent.  The right vertebral artery is larger than the left. There is flow within both vertebral arteries. The left vertebral artery terminates in the left PICA. There is flow within the intracranial right vertebral artery. Basilar artery is somewhat small. There is a fetal origin of the left posterior cerebral artery. Both posterior cerebral arteries are patent.  CT cerebral perfusion exam with Rapid software demonstrates 0 mL where there is CBF less than 30% volume and 0 mL where there is Tmax greater than 6 seconds. With a cut off Tmax greater than 4 seconds, there is a slight delayed perfusion to the region of the left parietal lobe and to lesser degree within the right frontal lobe. The postcontrast enhanced head CT demonstrates no abnormal intracranial enhancement. Within the medial left maxillary sinus there is an 8 mm mucous retention cyst.      1. Linear intraluminal filling defect within the distal left cervical and petrous segments of the left internal carotid artery begins at the horizontal level of the C1 ring and extends to the junction of the petrous and cavernous segments.   The appearance is consistent with a dissection with associated thrombus and is associated with moderate narrowing. 2. CT cerebral perfusion exam demonstrates no focal areas where there is CBF less than 30% volume or Tmax greater than 6 seconds. Noncontrasted head CT demonstrates no evidence for intracranial hemorrhage or acute abnormality. MRI evaluation may be helpful to evaluate acute stroke in this patient with right-sided weakness.  Findings of a noncontrast exam discussed with Dr. Bagley while the patient was on the CT table at 1740.  Findings of a contrast exam discussed with Dr. Bagley at 1815 p.m.  This report was finalized on 4/15/2019 7:20 AM by Dr. Francois Perry M.D.      Ct Cerebral Perfusion With & Without Contrast    Result Date: 4/15/2019  CT HEAD WITHOUT CONTRAST, CT ANGIOGRAM HEAD AND NECK WITH IV CONTRAST, CT CEREBRAL PERFUSION EXAM WITH AND WITHOUT CONTRAST.  TECHNIQUE: Head CT as obtained without IV contrast. CT angiogram of the head and neck was obtained with IV contrast in the arterial phase and this data was reconstructed in coronal and sagittal planes. 3-D volume rendering was performed. CT cerebral perfusion exam was obtained with and without IV contrast.  FINDINGS: Noncontrasted head CT demonstrates no abnormal area of increased attenuation tracks to suggest hemorrhage. No extra-axial fluid collection is observed. There is no mass effect or shift of midline structures.  No extraaxial fluid collection is evident. Ventricles appear within normal limits for size and configuration.  Within the distal cervical internal carotid artery there is a linear internal filling defect that begins at the horizontal level of the C1 ring. Linear, irregular filling defect extends cephalad through the petrous segment to the junction of the petrous and cavernous segments of the left ICA. This linear filling defect has the appearance of a dissection associated with areas of thrombus up to moderate stenosis without complete stenosis or occlusion. The cavernous and supracavernous left ICA is patent.  There is atherosclerotic calcified plaque involving the carotid bulbs and both ICA origins without NASCET stenosis of the proximal ICAs or carotid bulbs. Both common carotid arteries are patent. The right cervical ICA is patent. The right petrous and cavernous and supracavernous ICA is patent. Both ACAs and MCAs and major branches appear patent.  The right vertebral artery is larger than the left. There is flow within both vertebral arteries. The  left vertebral artery terminates in the left PICA. There is flow within the intracranial right vertebral artery. Basilar artery is somewhat small. There is a fetal origin of the left posterior cerebral artery. Both posterior cerebral arteries are patent.  CT cerebral perfusion exam with Rapid software demonstrates 0 mL where there is CBF less than 30% volume and 0 mL where there is Tmax greater than 6 seconds. With a cut off Tmax greater than 4 seconds, there is a slight delayed perfusion to the region of the left parietal lobe and to lesser degree within the right frontal lobe. The postcontrast enhanced head CT demonstrates no abnormal intracranial enhancement. Within the medial left maxillary sinus there is an 8 mm mucous retention cyst.      1. Linear intraluminal filling defect within the distal left cervical and petrous segments of the left internal carotid artery begins at the horizontal level of the C1 ring and extends to the junction of the petrous and cavernous segments.   The appearance is consistent with a dissection with associated thrombus and is associated with moderate narrowing. 2. CT cerebral perfusion exam demonstrates no focal areas where there is CBF less than 30% volume or Tmax greater than 6 seconds. Noncontrasted head CT demonstrates no evidence for intracranial hemorrhage or acute abnormality. MRI evaluation may be helpful to evaluate acute stroke in this patient with right-sided weakness.  Findings of a noncontrast exam discussed with Dr. Bagley while the patient was on the CT table at 1740. Findings of a contrast exam discussed with Dr. Bagley at 1815 p.m.  This report was finalized on 4/15/2019 7:20 AM by Dr. Francois Perry M.D.      Results for orders placed during the hospital encounter of 04/14/19   Adult Transthoracic Echo Complete W/ Cont if Necessary Per Protocol (With Agitated Saline)    Narrative · Left ventricular systolic function is normal. Calculated EF = 63.0%.    Estimated EF was in agreement with the calculated EF. Estimated EF = 63%.   Normal left ventricular cavity size and wall thickness noted. All left   ventricular wall segments contract normally. Left ventricular diastolic   function is normal.  · Left atrial volume is borderline increased. Right atrial cavity size is   borderline dilated.  · The aortic valve is abnormal in structure. There is moderate   calcification of the aortic valve mainly affecting the left coronary   cusp(s).No aortic valve regurgitation is present. No aortic valve stenosis   is present.              Active Hospital Problems    Diagnosis  POA   • CVA (cerebral vascular accident) (CMS/[facility]) [I63.9]  Yes      Resolved Hospital Problems   No resolved problems to display.         Assessment/Plan     1. Acute left MCA territory stroke status post TPA management per stroke service, plans noted CTA/CTP today  2. Left internal carotid artery dissection  3. Right homonymous hemianopsia secondary to #1 seems to have improved dramatically  4. Right hemiparesis secondary #1 physical and occupational therapy evaluations  5. Poorly controlled hypertension permissive hypertension but trying to keep under 180 systolic.  Eventually lower to goal blood pressures  6.  dyslipidemia patient is on chronic management levels look good today  7. Headache improving after tylenol  8. Snoring and physical features suggestive of sleep apnea he should have a sleep study as an outpatient  9. Daily alcohol use we will have to watch for any signs of alcohol withdrawal.  At this point his use if he is correct his really quite low I am not going to schedule any benzodiazepines as that may compromise our neurologic exam. Nothing on exam to suggest    Plan for disposition:    Ousmane Blackburn MD  04/16/19  9:56 AM    Time:

## 2019-04-16 NOTE — PLAN OF CARE
Problem: Patient Care Overview  Goal: Plan of Care Review   04/16/19 1207   OTHER   Outcome Summary Pt independent with mobility. Able to ambulate and transfer without assist. Pt reports vision is unchanged from initial stroke. No LOB or strength deficits. No further acute care PT needs. RN and pt are in agreement.

## 2019-04-16 NOTE — NURSING NOTE
Received referral through stroke order set. Based on therapy evals, no determined need for inpatient program. Will sign off. Thanks, Paula CHAUDHRY rehab admission nurse [phone]

## 2019-04-16 NOTE — THERAPY DISCHARGE NOTE
Acute Care - Occupational Therapy Treatment Note/Discharge  [facility]     Patient Name: Neeraj Frazier  : 1960  MRN: [id number]  Today's Date: 2019  Onset of Illness/Injury or Date of Surgery: 19  Date of Referral to OT: 19  Referring Physician: Dr. Bagley       Admit Date: 2019    Visit Dx:   No diagnosis found.  Patient Active Problem List   Diagnosis   • CVA (cerebral vascular accident) (CMS/Formerly [facility])       Therapy Treatment    Rehabilitation Treatment Summary     Row Name 19 1557 19 1138          Treatment Time/Intention    Discipline  occupational therapist  -SG  physical therapist  -MA     Document Type  therapy note (daily note);discharge treatment  -SG  discharge treatment  -MA     Subjective Information  no complaints  -SG  no complaints  -MA     Mode of Treatment  individual therapy;occupational therapy  -SG  physical therapy;individual therapy  -MA     Patient/Family Observations  Pt states he feels like he is back to baseline.  -SG  supine in bed, no acute distress  -MA     Patient Effort  good  -SG  excellent  -MA     Existing Precautions/Restrictions  no known precautions/restrictions  -SG  --     Recorded by [SG] Ness Del Rosario, OTR 19 [MA] Jihan Vora, PT 19 1206     Row Name 19 1557 19 1138          Vital Signs    O2 Delivery Pre Treatment  room air  -SG  room air  -MA     Recorded by [SG] Ness Del Rosario OTR 19 [MA] Jihan Vora, PT 19 1206     Row Name 19 1557 19 1138          Cognitive Assessment/Intervention- PT/OT    Orientation Status (Cognition)  oriented x 4  -SG  oriented x 4  -MA     Follows Commands (Cognition)  WNL  -SG  WNL  -MA     Recorded by [SG] Ness Del Rosario OTR 19 155 [MA] Jihan Vora, PT 19 1206     Row Name 19 1557 19 1138          Bed Mobility Assessment/Treatment    Bed Mobility Assessment/Treatment  sit-supine  -SG  --     Supine-Sit  [place] (Bed Mobility)  independent  -SG  independent  -MA     Sit-Supine [place] (Bed Mobility)  independent  -SG  --     Recorded by [SG] Ness Del Rosario, OTR 04/16/19 1558 [MA] Jihan Vora, PT 04/16/19 1206     Row Name 04/16/19 1557             Functional Mobility    Functional Mobility- Ind. Level  conditional independence  -SG      Functional Mobility- Comment  Walks to bathroom  -SG      Recorded by [SG] Ness Del Rosario OTR 04/16/19 1558      Row Name 04/16/19 1557             Transfer Assessment/Treatment    Transfer Assessment/Treatment  sit-stand transfer;stand-sit transfer;toilet transfer  -SG      Recorded by [SG] Ness Del Rosario OTR 04/16/19 1558      Row Name 04/16/19 1557 04/16/19 1138          Sit-Stand Transfer    Sit-Stand [place] (Transfers)  independent  -SG  independent  -MA     Recorded by [SG] Ness Del Rosario OTR 04/16/19 1558 [MA] Jihan Vora, PT 04/16/19 1206     Row Name 04/16/19 1557 04/16/19 1138          Stand-Sit Transfer    Stand-Sit [place] (Transfers)  independent  -SG  independent  -MA     Recorded by [SG] Ness Del Rosario, OTR 04/16/19 1558 [MA] Jihan Vora, PT 04/16/19 1206     Row Name 04/16/19 1557             Toilet Transfer    Type (Toilet Transfer)  sit-stand;stand-sit  -SG      [place] Level (Toilet Transfer)  independent  -SG      Recorded by [SG] Ness Del Rosario OTR 04/16/19 1558      Row Name 04/16/19 1138             Gait/Stairs Assessment/Training    [place] Level (Gait)  conditional independence  -MA      Assistive Device (Gait)  -- no AD  -MA      Distance in Feet (Gait)  300  -MA      Recorded by [MA] Jihan Vora, PT 04/16/19 1206      Row Name 04/16/19 1557             ADL Assessment/Intervention    BADL Assessment/Intervention  -- States is performing with no difficulties  -SG      Recorded by [SG] Ness Del Rosario OTR 04/16/19 1558      Row Name 04/16/19 1557             Motor Skills Assessment/Interventions     Additional Documentation  Fine Motor Testing & Training (Group)  -SG      Recorded by [SG] Ness Del Rosario, OTR 04/16/19 1558      Row Name 04/16/19 1557             Fine Motor Testing & Training    Comment, Fine Motor Coordination  States R hand FMC back to baseline  -SG      Recorded by [SG] Ness Del Rosario, OTR 04/16/19 1558      Row Name 04/16/19 1557 04/16/19 1138          Positioning and Restraints    Pre-Treatment Position  in bed  -SG  in bed  -MA     Post Treatment Position  bed  -SG  chair  -MA     In Bed  supine;call light within reach;encouraged to call for assist;exit alarm on  -SG  --     In Chair  --  notified nsg;sitting;call light within reach;encouraged to call for assist  -MA     Recorded by [SG] Ness Del Rosario, OTR 04/16/19 1558 [MA] Jihan Vora, PT 04/16/19 1206     Row Name 04/16/19 1557 04/16/19 1138          Pain Scale: Numbers Pre/Post-Treatment    Pain Scale: Numbers, Pretreatment  0/10 - no pain  -SG  0/10 - no pain  -MA     Pain Scale: Numbers, Post-Treatment  0/10 - no pain  -SG  0/10 - no pain  -MA     Recorded by [SG] Ness Del Rosario, OTR 04/16/19 1558 [MA] Jihan Vora, PT 04/16/19 1206       User Key  (r) = Recorded By, (t) = Taken By, (c) = Cosigned By    Initials Name Effective Dates Discipline     Ness Del Rosario, OTR 12/26/18 -  OT    MA Jhian Vora, PT 10/19/18 -  PT             Rehab Goal Summary     Row Name 04/16/19 1600 04/16/19 1200          Physical Therapy Goals    Gait Training Goal Selection (PT)  --  gait training, PT goal 1  -MA        Gait Training Goal 1 (PT)    Progress/Outcome (Gait Training Goal 1, PT)  --  goal met  -MA        Transfer Goal 1 (OT)    Progress/Outcome (Transfer Goal 1, OT)  goal met  -SG  --        Grooming Goal 1 (OT)    Progress/Outcome (Grooming Goal 1, OT)  goal met  -SG  --         Activity Tolerance Goal 1 (OT)    Progress/Outcome (Activity Tolerance Goal 1, OT)  goal met  -SG  --       User Key  (r) = Recorded By, (t) = Taken By,  (c) = Cosigned By    Initials Name Provider Type Discipline     Ness Del Rosario, OTR Occupational Therapist OT    Jihan Hu, PT Physical Therapist PT          Occupational Therapy Education     Title: PT OT SLP Therapies (In Progress)     Topic: Occupational Therapy (In Progress)     Point: ADL training (Done)     Description: Instruct learner(s) on proper safety adaptation and remediation techniques during self care or transfers.   Instruct in proper use of assistive devices.    Learning Progress Summary           Patient Acceptance, E, VU,DU by SK at 4/15/2019  3:06 PM    Comment:  discussed transfers and safety,  Pt demo understanding with transfer to toilet                               User Key     Initials Effective Dates Name Provider Type Discipline    SK 02/25/19 -  Hilary Bradley, OT Occupational Therapist OT                OT Recommendation and Plan     Plan of Care Review  Plan of Care Reviewed With: patient  Plan of Care Reviewed With: patient  Outcome Summary: Pt able to perform functional mobility at indep level, states no difficulty with R hand and does not want OT. OT will d/c at this time, re-order if needed.    Outcome Measures     Row Name 04/16/19 1600 04/16/19 1200 04/15/19 1500       How much help from another person do you currently need...    Turning from your back to your side while in flat bed without using bedrails?  --  4  -MA  --    Moving from lying on back to sitting on the side of a flat bed without bedrails?  --  4  -MA  --    Moving to and from a bed to a chair (including a wheelchair)?  --  4  -MA  --    Standing up from a chair using your arms (e.g., wheelchair, bedside chair)?  --  4  -MA  --    Climbing 3-5 steps with a railing?  --  4  -MA  --    To walk in hospital room?  --  4  -MA  --    AM-PAC 6 Clicks Score  --  24  -MA  --       How much help from another is currently needed...    Putting on and taking off regular lower body clothing?  4  -SG  --  3  -SK     Bathing (including washing, rinsing, and drying)  4  -SG  --  3  -SK    Toileting (which includes using toilet bed pan or urinal)  4  -SG  --  3  -SK    Putting on and taking off regular upper body clothing  4  -SG  --  4  -SK    Taking care of personal grooming (such as brushing teeth)  4  -SG  --  4  -SK    Eating meals  4  -SG  --  4  -SK    Score  24  -SG  --  21  -SK       Modified Bledsoe Scale    Pre-Stroke Modified Bledsoe Scale  --  --  0 - No Symptoms at all.  -SK    Modified Bledsoe Scale  --  --  2 - Slight disability.  Unable to carry out all previous activities but able to look after own affairs without assistance.  -SK       Functional Assessment    Outcome Measure Options  AM-PAC 6 Clicks Daily Activity (OT)  -  AM-PAC 6 Clicks Basic Mobility (PT)  -MA  AM-PAC 6 Clicks Daily Activity (OT);Modified Felipe  -SK    Row Name 04/15/19 1300             How much help from another person do you currently need...    Turning from your back to your side while in flat bed without using bedrails?  4  -SV      Moving from lying on back to sitting on the side of a flat bed without bedrails?  4  -SV      Moving to and from a bed to a chair (including a wheelchair)?  3  -SV      Standing up from a chair using your arms (e.g., wheelchair, bedside chair)?  3  -SV      Climbing 3-5 steps with a railing?  3  -SV      To walk in hospital room?  3  -SV      AM-PAC 6 Clicks Score  20  -SV         Functional Assessment    Outcome Measure Options  AM-PAC 6 Clicks Basic Mobility (PT)  -SV        User Key  (r) = Recorded By, (t) = Taken By, (c) = Cosigned By    Initials Name Provider Type    Ness Neumann, OTR Occupational Therapist    Yodit Oquendo, PT Physical Therapist    Jihan Hu, PT Physical Therapist    Hilary Troy, OT Occupational Therapist           Time Calculation:    Time Calculation- OT     Row Name 04/16/19 1600             Time Calculation- OT    OT Start Time  1048  -      OT Stop Time   1056  -      OT Time Calculation (min)  8 min  -      OT Received On  04/16/19  -        User Key  (r) = Recorded By, (t) = Taken By, (c) = Cosigned By    Initials Name Provider Type    Ness Neumann OTR Occupational Therapist        Therapy Suggested Charges     Code   Minutes Charges    None           Therapy Charges for Today     Code Description Service Date Service Provider Modifiers Qty    76241712250 HC OT SELF CARE/MGMT/TRAIN EA 15 MIN 4/16/2019 Ness Del Rosario OTR GO 1                    KALIA Kirby  4/16/2019

## 2019-04-16 NOTE — PLAN OF CARE
Problem: Patient Care Overview  Goal: Plan of Care Review  Outcome: Ongoing (interventions implemented as appropriate)   04/16/19 3145   Coping/Psychosocial   Plan of Care Reviewed With patient   OTHER   Outcome Summary Patient doing well, only complaint is dexterity issues of the right hand. Follow up CTA completed. Otherwise Heparin gtt continues. VSS.

## 2019-04-16 NOTE — PROGRESS NOTES
"DOS: 2019  NAME: Neeraj Frazier   : 1960  PCP: Destini Castillo APRN  No chief complaint on file.      Chief complaint: Stroke  Subjective: No acute events overnight, headache is resolved, mild right hand coordination difficulty persists.    Objective: (12)  Vital signs: /93 (BP Location: Right arm, Patient Position: Lying)   Pulse 60   Temp 98.3 °F (36.8 °C) (Oral)   Resp 16   Ht 182 cm (71.65\")   Wt 81.4 kg (179 lb 7.3 oz)   SpO2 100%   BMI 24.57 kg/m²    Gen: NAD, vitals reviewed  MS: oriented x3, recent/remote memory intact, normal attention/concentration, language intact, no neglect.  CN: visual acuity grossly normal, PERRL, EOMI, no facial droop, no dysarthria  Motor: Right upper extremity drift, otherwise 5/5 throughout upper and lower extremities, normal tone  Sensory: intact to light touch all 4 ext.    Review of Systems  No fevers, chills  + Weakness, no numbness    Laboratory results:  No results found for: GLUCOSE, CALCIUM, NA, K, CO2, CL, BUN, CREATININE, EGFRIFAFRI, EGFRIFNONA, BCR, ANIONGAP  Lab Results   Component Value Date    WBC 5.62 2019    HGB 14.2 2019    HCT 42.3 2019    MCV 95.1 2019     2019     Lab Results   Component Value Date    CHOL 162 04/15/2019     Lab Results   Component Value Date    HDL 66 (H) 04/15/2019     Lab Results   Component Value Date    LDL 80 04/15/2019     Lab Results   Component Value Date    TRIG 82 04/15/2019     @[email or web address]@     Review of labs: CBC normal, LDL 80    Review and interpretation of imaging: MRI brain personally reviewed which shows watershed appearing left MCA stroke.  Discussed with the reading neuroradiologist Dr. Zepeda who recommends follow up CTA head and CT perfusion with cuts higher up to better characterize his flow, he's concerned the original CTP may have underestimated the patient's perfusion deficit. I ordered these studies.    Workup to date: Left MCA stroke due to distal left " cervical ICA dissection with thrombus.  MRI 4/15: Right MCA watershed stroke, minimal WMD  CTA/CTP 4/14: Left cervical ICA moderate stenosis, suspected dissection, moderate size thrombus  2D echo: negative    Impression:  1. Stroke, left middle cerebral artery, due to left carotid stenosis from disseciton  2. Left carotid dissection  3. Anticoagulated    Comment: Continues to do well on heparin gtt. Follow up CTA/CTP recommended by Dr. Zepeda which we'll do today. If this looks ok plan will likely be lovenox bridge to warfarin starting tomorrow and likely discharge tomorrow afternoon.    Plan:  1. Hep gtt, ASA 81  2. High dose statin  3. BP <160/110  4. Continue NS @ 75cc/hr  5. Out of ICU, q4 neuro checks  6. CTA H, CTP to better evaluate degree of stenosis with his dissection and estimate his short term risk of recurrent stroke.

## 2019-04-16 NOTE — THERAPY TREATMENT NOTE
Acute Care - Physical Therapy Treatment Note  [facility]     Patient Name: Neeraj Frazier  : 1960  MRN: [id number]  Today's Date: 2019  Onset of Illness/Injury or Date of Surgery: 19  Date of Referral to PT: 19  Referring Physician: Dr. Bagley     Admit Date: 2019    Visit Dx:  No diagnosis found.  Patient Active Problem List   Diagnosis   • CVA (cerebral vascular accident) (CMS/HCC)       Therapy Treatment    Rehabilitation Treatment Summary     Row Name 19 1138             Treatment Time/Intention    Discipline  physical therapist  -MA      Document Type  discharge treatment  -MA      Subjective Information  no complaints  -MA      Mode of Treatment  physical therapy;individual therapy  -MA      Patient/Family Observations  supine in bed, no acute distress  -MA      Patient Effort  excellent  -MA      Recorded by [MA] Jihan Vora, PT 19 1206      Row Name 19 1138             Vital Signs    O2 Delivery Pre Treatment  room air  -MA      Recorded by [MA] Jihan Vora, PT 19 1206      Row Name 19 1138             Cognitive Assessment/Intervention- PT/OT    Orientation Status (Cognition)  oriented x 4  -MA      Follows Commands (Cognition)  WNL  -MA      Recorded by [MA] Jihan Vora, PT 19 1206      Row Name 19 1138             Bed Mobility Assessment/Treatment    Supine-Sit [place] (Bed Mobility)  independent  -MA      Recorded by [MA] Jihan Vora, PT 19 1206      Row Name 19 1138             Sit-Stand Transfer    Sit-Stand [place] (Transfers)  independent  -MA      Recorded by [MA] Jihan Vora, PT 19 1206      Row Name 19 1138             Stand-Sit Transfer    Stand-Sit [place] (Transfers)  independent  -MA      Recorded by [MA] Jihan Vora, PT 19 1206      Row Name 19 1138             Gait/Stairs Assessment/Training    [place] Level (Gait)  conditional independence  -MA       Assistive Device (Gait)  -- no AD  -MA      Distance in Feet (Gait)  300  -MA      Recorded by [MA] Jihan Vora, PT 04/16/19 1206      Row Name 04/16/19 1138             Positioning and Restraints    Pre-Treatment Position  in bed  -MA      Post Treatment Position  chair  -MA      In Chair  notified nsg;sitting;call light within reach;encouraged to call for assist  -MA      Recorded by [MA] Jihan Vora, PT 04/16/19 1206      Row Name 04/16/19 1138             Pain Scale: Numbers Pre/Post-Treatment    Pain Scale: Numbers, Pretreatment  0/10 - no pain  -MA      Pain Scale: Numbers, Post-Treatment  0/10 - no pain  -MA      Recorded by [MA] Jihan Vora, PT 04/16/19 1206        User Key  (r) = Recorded By, (t) = Taken By, (c) = Cosigned By    Initials Name Effective Dates Discipline    Jihan Hu, PT 10/19/18 -  PT               Rehab Goal Summary     Row Name 04/16/19 1200             Physical Therapy Goals    Gait Training Goal Selection (PT)  gait training, PT goal 1  -MA         Gait Training Goal 1 (PT)    Progress/Outcome (Gait Training Goal 1, PT)  goal met  -MA        User Key  (r) = Recorded By, (t) = Taken By, (c) = Cosigned By    Initials Name Provider Type Discipline    Jihan Hu, PT Physical Therapist PT          Physical Therapy Education     Title: PT OT SLP Therapies (In Progress)     Topic: Physical Therapy (Resolved)     Point: Mobility training (Resolved)     Learning Progress Summary           Patient Acceptance, E, VU by MA at 4/16/2019 12:06 PM    Acceptance, E,TB,D, VU,NR by ANGELICA at 4/15/2019  1:20 PM                               User Key     Initials Effective Dates Name Provider Type Discipline     04/03/18 -  Yodit Hsu, PT Physical Therapist PT    MA 10/19/18 -  Jihan Vora, PT Physical Therapist PT                PT Recommendation and Plan     Outcome Summary: Pt independent with mobility. Able to ambulate and transfer without assist. Pt reports vision is unchanged from  initial stroke. No LOB or strength deficits. No further acute care PT needs. RN and pt are in agreement.   Outcome Measures     Row Name 04/16/19 1200 04/15/19 1500 04/15/19 1300       How much help from another person do you currently need...    Turning from your back to your side while in flat bed without using bedrails?  4  -MA  --  4  -SV    Moving from lying on back to sitting on the side of a flat bed without bedrails?  4  -MA  --  4  -SV    Moving to and from a bed to a chair (including a wheelchair)?  4  -MA  --  3  -SV    Standing up from a chair using your arms (e.g., wheelchair, bedside chair)?  4  -MA  --  3  -SV    Climbing 3-5 steps with a railing?  4  -MA  --  3  -SV    To walk in hospital room?  4  -MA  --  3  -SV    AM-PAC 6 Clicks Score  24  -MA  --  20  -SV       How much help from another is currently needed...    Putting on and taking off regular lower body clothing?  --  3  -SK  --    Bathing (including washing, rinsing, and drying)  --  3  -SK  --    Toileting (which includes using toilet bed pan or urinal)  --  3  -SK  --    Putting on and taking off regular upper body clothing  --  4  -SK  --    Taking care of personal grooming (such as brushing teeth)  --  4  -SK  --    Eating meals  --  4  -SK  --    Score  --  21  -SK  --       Modified Felipe Scale    Pre-Stroke Modified Gaines Scale  --  0 - No Symptoms at all.  -SK  --    Modified Gaines Scale  --  2 - Slight disability.  Unable to carry out all previous activities but able to look after own affairs without assistance.  -SK  --       Functional Assessment    Outcome Measure Options  AM-PAC 6 Clicks Basic Mobility (PT)  -MA  AM-PAC 6 Clicks Daily Activity (OT);Modified Feliep  -SK  AM-PAC 6 Clicks Basic Mobility (PT)  -      User Key  (r) = Recorded By, (t) = Taken By, (c) = Cosigned By    Initials Name Provider Type    SV Yodit Hsu, PT Physical Therapist    Jihan Hu, PT Physical Therapist    SK Hilary Bradley, OT  Occupational Therapist         Time Calculation:   PT Charges     Row Name 04/16/19 1208             Time Calculation    Start Time  1127  -MA      Stop Time  1138  -MA      Time Calculation (min)  11 min  -MA      PT Received On  04/16/19  -MA         Time Calculation- PT    Total Timed Code Minutes- PT  11 minute(s)  -MA        User Key  (r) = Recorded By, (t) = Taken By, (c) = Cosigned By    Initials Name Provider Type    Jihan Hu, PT Physical Therapist        Therapy Charges for Today     Code Description Service Date Service Provider Modifiers Qty    82314581872  PT THER PROC EA 15 MIN 4/16/2019 Jihan Vora, PT GP 1          PT G-Codes  Outcome Measure Options: AM-PAC 6 Clicks Basic Mobility (PT)  AM-PAC 6 Clicks Score: 24  Score: 21  Modified Felipe Scale: 2 - Slight disability.  Unable to carry out all previous activities but able to look after own affairs without assistance.    Jihan Vora, PT  4/16/2019

## 2019-04-16 NOTE — PLAN OF CARE
Problem: Patient Care Overview  Goal: Plan of Care Review  Outcome: Ongoing (interventions implemented as appropriate)   04/16/19 0457   Coping/Psychosocial   Plan of Care Reviewed With patient   OTHER   Outcome Summary Pt had no neuro changes throughout the shift. Vital signs stable. Minimal head pain. Heparin gtt still running.      Goal: Individualization and Mutuality   04/16/19 0457   Mutuality/Individual Preferences   What Anxieties, Fears, Concerns, or Questions Do You Have About Your Care? pt concerned about maintance at home   How Would You and/or Your Support Person Like to Participate in Your Care? keep pt up to date on new information.    Mutuality/Individual Preferences   How to Address Anxieties/Fears Address compliance with pt   Individualization   Patient Specific Preferences Light on and tv off   Patient Specific Goals (Include Timeframe) Goal to be discharged home    Patient Specific Interventions monitor vital signs and control headache     Goal: Discharge Needs Assessment   04/16/19 0457   Discharge Needs Assessment   Readmission Within the Last 30 Days no previous admission in last 30 days   Concerns to be Addressed medication   Patient/Family Anticipates Transition to home with family   Patient/Family Anticipated Services at Transition none   Transportation Concerns car, none   Transportation Anticipated family or friend will provide   Anticipated Changes Related to Illness none   Equipment Needed After Discharge none   Disability   Equipment Currently Used at Home none     Goal: Interprofessional Rounds/Family Conf  Outcome: Ongoing (interventions implemented as appropriate)   04/16/19 0457   Interdisciplinary Rounds/Family Conf   Participants nursing;family;patient

## 2019-04-16 NOTE — THERAPY DISCHARGE NOTE
Acute Care - Physical Therapy Initial Eval/Discharge  [facility]     Patient Name: Neeraj Frazier  : 1960  MRN: [id number]  Today's Date: 2019   Onset of Illness/Injury or Date of Surgery: 19  Date of Referral to PT: 19  Referring Physician: Dr. Bagley       Admit Date: 2019    Visit Dx:  No diagnosis found.  Patient Active Problem List   Diagnosis   • CVA (cerebral vascular accident) (CMS/Formerly [facility])     Past Medical History:   Diagnosis Date   • Hyperlipemia    • Hypertension      History reviewed. No pertinent surgical history.       PT ASSESSMENT (last 12 hours)      Physical Therapy Evaluation    No documentation.         Physical Therapy Education     Title: PT OT SLP Therapies (In Progress)     Topic: Physical Therapy (Resolved)     Point: Mobility training (Resolved)     Learning Progress Summary           Patient Acceptance, E, VU by MA at 2019 12:06 PM    Acceptance, E,TB,D, VU,NR by  at 4/15/2019  1:20 PM                               User Key     Initials Effective Dates Name Provider Type Discipline     18 -  Yodit Hsu, PT Physical Therapist PT    MA 10/19/18 -  Jihan Vora, PT Physical Therapist PT                PT Recommendation and Plan     Outcome Summary: Pt independent with mobility. Able to ambulate and transfer without assist. Pt reports vision is unchanged from initial stroke. No LOB or strength deficits. No further acute care PT needs. RN and pt are in agreement.     Outcome Measures     Row Name 19 1200 04/15/19 1500 04/15/19 1300       How much help from another person do you currently need...    Turning from your back to your side while in flat bed without using bedrails?  4  -MA  —  4  -SV    Moving from lying on back to sitting on the side of a flat bed without bedrails?  4  -MA  —  4  -SV    Moving to and from a bed to a chair (including a wheelchair)?  4  -MA  —  3  -SV    Standing up from a chair using your arms (e.g., wheelchair,  bedside chair)?  4  -MA  —  3  -SV    Climbing 3-5 steps with a railing?  4  -MA  —  3  -SV    To walk in hospital room?  4  -MA  —  3  -SV    AM-PAC 6 Clicks Score  24  -MA  —  20  -SV       How much help from another is currently needed...    Putting on and taking off regular lower body clothing?  —  3  -SK  —    Bathing (including washing, rinsing, and drying)  —  3  -SK  —    Toileting (which includes using toilet bed pan or urinal)  —  3  -SK  —    Putting on and taking off regular upper body clothing  —  4  -SK  —    Taking care of personal grooming (such as brushing teeth)  —  4  -SK  —    Eating meals  —  4  -SK  —    Score  —  21  -SK  —       Modified Tovey Scale    Pre-Stroke Modified Felipe Scale  —  0 - No Symptoms at all.  -SK  —    Modified Tovey Scale  —  2 - Slight disability.  Unable to carry out all previous activities but able to look after own affairs without assistance.  -SK  —       Functional Assessment    Outcome Measure Options  AM-PAC 6 Clicks Basic Mobility (PT)  -MA  AM-PAC 6 Clicks Daily Activity (OT);Modified Tovey  -SK  AM-PAC 6 Clicks Basic Mobility (PT)  -SV      User Key  (r) = Recorded By, (t) = Taken By, (c) = Cosigned By    Initials Name Provider Type    Yodit Oquendo, PT Physical Therapist    Jihan Hu, PT Physical Therapist    SK Hilary Bradley, OT Occupational Therapist           Time Calculation:   PT Charges     Row Name 04/16/19 1208             Time Calculation    Start Time  1127  -MA      Stop Time  1138  -MA      Time Calculation (min)  11 min  -MA      PT Received On  04/16/19  -MA         Time Calculation- PT    Total Timed Code Minutes- PT  11 minute(s)  -MA        User Key  (r) = Recorded By, (t) = Taken By, (c) = Cosigned By    Initials Name Provider Type    Jihan Hu, PT Physical Therapist        Therapy Charges for Today     Code Description Service Date Service Provider Modifiers Qty    [id number]  PT THER PROC EA 15 MIN 4/16/2019 Diony  Jiahn, PT GP 1          PT G-Codes  Outcome Measure Options: AM-PAC 6 Clicks Basic Mobility (PT)  AM-PAC 6 Clicks Score: 24  Score: 21  Modified Hockley Scale: 2 - Slight disability.  Unable to carry out all previous activities but able to look after own affairs without assistance.         Jihan Vora, PT  4/16/2019

## 2019-04-17 VITALS
BODY MASS INDEX: 25.2 KG/M2 | HEIGHT: 71 IN | TEMPERATURE: 97.6 F | SYSTOLIC BLOOD PRESSURE: 145 MMHG | WEIGHT: 180 LBS | OXYGEN SATURATION: 98 % | DIASTOLIC BLOOD PRESSURE: 81 MMHG | HEART RATE: 72 BPM | RESPIRATION RATE: 16 BRPM

## 2019-04-17 LAB
APTT PPP: 107.1 SECONDS (ref 22.7–35.4)
BASOPHILS # BLD AUTO: 0.03 10*3/MM3 (ref 0–0.2)
BASOPHILS NFR BLD AUTO: 0.6 % (ref 0–1.5)
DEPRECATED RDW RBC AUTO: 43.8 FL (ref 37–54)
EOSINOPHIL # BLD AUTO: 0.11 10*3/MM3 (ref 0–0.4)
EOSINOPHIL NFR BLD AUTO: 2.4 % (ref 0.3–6.2)
ERYTHROCYTE [DISTWIDTH] IN BLOOD BY AUTOMATED COUNT: 12.2 % (ref 12.3–15.4)
HCT VFR BLD AUTO: 39.9 % (ref 37.5–51)
HGB BLD-MCNC: 13.1 G/DL (ref 13–17.7)
IMM GRANULOCYTES # BLD AUTO: 0.01 10*3/MM3 (ref 0–0.05)
IMM GRANULOCYTES NFR BLD AUTO: 0.2 % (ref 0–0.5)
LYMPHOCYTES # BLD AUTO: 2.14 10*3/MM3 (ref 0.7–3.1)
LYMPHOCYTES NFR BLD AUTO: 46.2 % (ref 19.6–45.3)
MCH RBC QN AUTO: 31.6 PG (ref 26.6–33)
MCHC RBC AUTO-ENTMCNC: 32.8 G/DL (ref 31.5–35.7)
MCV RBC AUTO: 96.4 FL (ref 79–97)
MONOCYTES # BLD AUTO: 0.41 10*3/MM3 (ref 0.1–0.9)
MONOCYTES NFR BLD AUTO: 8.9 % (ref 5–12)
NEUTROPHILS # BLD AUTO: 1.93 10*3/MM3 (ref 1.4–7)
NEUTROPHILS NFR BLD AUTO: 41.7 % (ref 42.7–76)
NRBC BLD AUTO-RTO: 0 /100 WBC (ref 0–0)
PLATELET # BLD AUTO: 214 10*3/MM3 (ref 140–450)
PMV BLD AUTO: 9.7 FL (ref 6–12)
RBC # BLD AUTO: 4.14 10*6/MM3 (ref 4.14–5.8)
WBC NRBC COR # BLD: 4.63 10*3/MM3 (ref 3.4–10.8)

## 2019-04-17 PROCEDURE — 85025 COMPLETE CBC W/AUTO DIFF WBC: CPT | Performed by: PSYCHIATRY & NEUROLOGY

## 2019-04-17 PROCEDURE — 99233 SBSQ HOSP IP/OBS HIGH 50: CPT | Performed by: NURSE PRACTITIONER

## 2019-04-17 PROCEDURE — 85730 THROMBOPLASTIN TIME PARTIAL: CPT | Performed by: PSYCHIATRY & NEUROLOGY

## 2019-04-17 RX ORDER — ATORVASTATIN CALCIUM 80 MG/1
80 TABLET, FILM COATED ORAL NIGHTLY
Qty: 30 TABLET | Refills: 0 | Status: SHIPPED | OUTPATIENT
Start: 2019-04-17 | End: 2019-05-14 | Stop reason: SDUPTHER

## 2019-04-17 RX ORDER — ASPIRIN 81 MG/1
81 TABLET, CHEWABLE ORAL DAILY
Qty: 100 TABLET | Refills: 0 | Status: SHIPPED | OUTPATIENT
Start: 2019-04-18 | End: 2019-06-05

## 2019-04-17 RX ADMIN — Medication 1 TABLET: at 08:57

## 2019-04-17 RX ADMIN — Medication 100 MG: at 08:57

## 2019-04-17 RX ADMIN — FOLIC ACID 1 MG: 1 TABLET ORAL at 08:57

## 2019-04-17 RX ADMIN — ASPIRIN 81 MG: 81 TABLET, CHEWABLE ORAL at 08:57

## 2019-04-17 RX ADMIN — RIVAROXABAN 20 MG: 20 TABLET, FILM COATED ORAL at 17:43

## 2019-04-17 RX ADMIN — SODIUM CHLORIDE, PRESERVATIVE FREE 3 ML: 5 INJECTION INTRAVENOUS at 08:58

## 2019-04-17 NOTE — PLAN OF CARE
Problem: Patient Care Overview  Goal: Plan of Care Review   04/17/19 3454   OTHER   Outcome Summary VSS, AOX4, NIH =0, follows commands and answers questions appropriately. No changes observed to neuro status since initial shift assessment. Heparin gtt continues to piv at 18U/kg/hr per mar. NS continues to piv at 75ml/hr per mar. Next PTT due at 1040. Pt has been observed ambulating with a steady gait. No s/s of distress observed. Will cont to monitor.

## 2019-04-17 NOTE — PROGRESS NOTES
"DOS: 2019  NAME: Neeraj Frazier   : 1960  PCP: Destini Castillo APRN  Chief complaint: Visual disturbance, speech difficulty, and right-sided weakness    Stroke    Subjective: Patient states he is at baseline without any visual complaints or weakness.  He does note that he has a mild headache.  Pt seen in follow up today, however the problem is new to the examiner.      Objective:  Vital signs: /97 (BP Location: Right arm, Patient Position: Lying)   Pulse 71   Temp 97.6 °F (36.4 °C) (Oral)   Resp 16   Ht 180.3 cm (71\")   Wt 81.6 kg (180 lb)   SpO2 98%   BMI 25.10 kg/m²       General appearance: Well developed, well nourished, well groomed, alert and cooperative.   HEENT: Normocephalic.   Neck and spine: Normal range of motion. Normal alignment. No mass or tenderness.   Cardiac: Regular rate and rhythm. No murmurs.   Peripheral Vasculature: Peripheral pulses are equal and symmetric.  Chest Exam: Clear to auscultation bilaterally, no wheezes, no rhonchi.  Extremities: Normal, no edema.   Skin: No rashes or birthmarks.     Higher integrative function: Oriented to time, place, person, intact recent and remote memory, attention span, concentration and language. Spontaneous speech, fund of vocabulary are normal.   CN II: Normal visual fields.   CN III IV VI: Extraocular movements are full without nystagmus.  Right pupil 3 mm, left pupil 2 mm, both are round and briskly reactive.  No relative afferent pupillary defect. No internuclear ophthalmoplegia.   CN V: Normal facial sensation and strength of muscles of mastication.   CN VII: Facial movements are symmetric, no weakness.   CN VIII: Auditory acuity is normal.   CN IX & X: Symmetric palatal movement.   CN XI: Sternocleidomastoid and trapezius are normal. No weakness.   CN XII: The tongue is midline. No atrophy or fasciculations.   Motor: Normal muscle strength, bulk, and tone in upper and lower extremities, except for right upper extremity " pronator drift. No fasciculations, rigidity, spasticity or abnormal movements.   Sensation: Normal light touch.  Station and gait: Not assessed.  Muscle stretch reflexes: Reflexes are normal and symmetric in the upper and lower extremities.   Coordination: Finger to nose test showed no dysmetria. Rapid alternating movements were normal. Heel to shin normal.       Scheduled Meds:  aspirin 81 mg Oral Daily   Or      aspirin 300 mg Rectal Daily   atorvastatin 80 mg Oral Nightly   vitamin B-1 100 mg Oral Daily   And      multivitamin 1 tablet Oral Daily   And      folic acid 1 mg Oral Daily   sodium chloride 3 mL Intravenous Q12H     Continuous Infusions:  heparin (porcine) 12 Units/kg/hr Last Rate: 18 Units/kg/hr (04/17/19 0441)   sodium chloride 75 mL/hr Last Rate: 75 mL/hr (04/16/19 2106)     PRN Meds:.•  acetaminophen  •  LORazepam **OR** LORazepam **OR** LORazepam **OR** LORazepam **OR** LORazepam **OR** LORazepam **OR** LORazepam **OR** LORazepam  •  ondansetron **OR** ondansetron  •  sodium chloride    Laboratory results:  No results found for: GLUCOSE, CALCIUM, NA, K, CO2, CL, BUN, CREATININE, EGFRIFAFRI, EGFRIFNONA, BCR, ANIONGAP  Lab Results   Component Value Date    WBC 4.63 04/17/2019    HGB 13.1 04/17/2019    HCT 39.9 04/17/2019    MCV 96.4 04/17/2019     04/17/2019     Lab Results   Component Value Date    CHOL 162 04/15/2019     Lab Results   Component Value Date    HDL 66 (H) 04/15/2019     Lab Results   Component Value Date    LDL 80 04/15/2019     Lab Results   Component Value Date    TRIG 82 04/15/2019     Lab Results   Component Value Date    HGBA1C 5.30 04/15/2019       Review and interpretation of imaging:MRI brain images viewed by me, shows scattered areas of left hemisphere stroke including cortex  CT HEAD WITHOUT CONTRAST, CT ANGIOGRAM HEAD AND NECK WITH IV CONTRAST,  CT CEREBRAL PERFUSION EXAM WITH AND WITHOUT CONTRAST.     TECHNIQUE: Head CT as obtained without IV contrast. CT angiogram  of the  head and neck was obtained with IV contrast in the arterial phase and  this data was reconstructed in coronal and sagittal planes. 3-D volume  rendering was performed. CT cerebral perfusion exam was obtained with  and without IV contrast.     FINDINGS: Noncontrasted head CT demonstrates no abnormal area of  increased attenuation tracks to suggest hemorrhage. No extra-axial fluid  collection is observed. There is no mass effect or shift of midline  structures.  No extraaxial fluid collection is evident. Ventricles  appear within normal limits for size and configuration.     Within the distal cervical internal carotid artery there is a linear  internal filling defect that begins at the horizontal level of the C1  ring. Linear, irregular filling defect extends cephalad through the  petrous segment to the junction of the petrous and cavernous segments of  the left ICA. This linear filling defect has the appearance of a  dissection associated with areas of thrombus up to moderate stenosis  without complete stenosis or occlusion. The cavernous and supracavernous  left ICA is patent.     There is atherosclerotic calcified plaque involving the carotid bulbs  and both ICA origins without NASCET stenosis of the proximal ICAs or  carotid bulbs. Both common carotid arteries are patent. The right  cervical ICA is patent. The right petrous and cavernous and  supracavernous ICA is patent. Both ACAs and MCAs and major branches  appear patent.     The right vertebral artery is larger than the left. There is flow within  both vertebral arteries. The left vertebral artery terminates in the  left PICA. There is flow within the intracranial right vertebral artery.  Basilar artery is somewhat small. There is a fetal origin of the left  posterior cerebral artery. Both posterior cerebral arteries are patent.     CT cerebral perfusion exam with Rapid software demonstrates 0 mL where  there is CBF less than 30% volume and 0 mL where  there is Tmax greater  than 6 seconds. With a cut off Tmax greater than 4 seconds, there is a  slight delayed perfusion to the region of the left parietal lobe and to  lesser degree within the right frontal lobe. The postcontrast enhanced  head CT demonstrates no abnormal intracranial enhancement. Within the  medial left maxillary sinus there is an 8 mm mucous retention cyst.     IMPRESSION:  1. Linear intraluminal filling defect within the distal left cervical  and petrous segments of the left internal carotid artery begins at the  horizontal level of the C1 ring and extends to the junction of the  petrous and cavernous segments.   The appearance is consistent with a  dissection with associated thrombus and is associated with moderate  narrowing.  2. CT cerebral perfusion exam demonstrates no focal areas where there is  CBF less than 30% volume or Tmax greater than 6 seconds. Noncontrasted  head CT demonstrates no evidence for intracranial hemorrhage or acute  abnormality. MRI evaluation may be helpful to evaluate acute stroke in  this patient with right-sided weakness.     Findings of a noncontrast exam discussed with Dr. Bagley while the  patient was on the CT table at 1740. Findings of a contrast exam  discussed with Dr. Bagley at 1815 p.m.     This report was finalized on 4/15/2019 7:20 AM by Dr. Francois Perry M.D.  MRI BRAIN WITHOUT CONTRAST-  04/15/2019     HISTORY: Left facial numbness. Speech difficulty.     TECHNIQUE: Multiple sagittal and axial noncontrast images were obtained  through the brain.     FINDINGS: On the diffusion-weighted images, there is a ill-defined area  of bright signal intensity in the left parieto-occipital region  measuring approximately 2.4 cm. This is consistent with an area of acute  to subacute infarct. Several additional tiny areas of bright signal  intensity are seen in the left superior frontoparietal region on the  diffusion-weighted images. These are consistent with  additional tiny  acute to subacute infarcts.     No intracranial hemorrhage is seen. Brain parenchyma and ventricular  system otherwise appear within normal limits.     Craniocervical junction and sella appear normal.     FLAIR images show scattered tiny foci of bright signal intensity in the  bilateral cerebral white matter, consistent with minimal small vessel  white matter ischemic disease.     Normal-appearing vascular flow voids are seen.     IMPRESSION:  1. Several areas of acute to subacute infarct in the left cerebral  hemisphere with the largest in the left parieto-occipital region  measuring 2.4 cm. Please see full discussion above.  2. No evidence of intracranial hemorrhage.     This report was finalized on 4/16/2019 9:46 AM by Dr. Kali Johnston M.D.     CT ANGIOGRAM OF THE HEAD AND CT PERFUSION STUDY WITH CONTRAST     CLINICAL HISTORY: Vision loss in left eye.     TECHNIQUE: CT scan of the head was obtained with 3 mm axial images  before and after the administration of IV contrast. Sagittal, coronal,  and 3-dimensional reconstructed images were obtained. A CT perfusion  study was performed after the bolus administration of IV contrast.  Standard perfusion maps were constructed. Finally, a delayed  postcontrast head CT was performed with 3 mm axial images.     Comparison is made to prior CT angiogram and CT perfusion study dated  04/14/2019.     FINDINGS:        PRE AND POSTCONTRAST HEAD CT: There are foci of acute infarction within  the left superior parietal lobe and the left parieto-occipital junction.  The largest of these areas is seen within the left parieto-occipital  junction and measures up to approximately 2.1 x 1.1 cm in greatest axial  dimensions. Additional smaller areas are noted within the left  precentral gyrus, superior frontal gyrus, and middle frontal gyrus. The  largest of these areas measures up to approximately 7 mm in diameter.  These infarcts are all within the left MCA  distribution and when  compared to the prior brain MRI of 04/15/2019, there is no convincing  evidence to suggest new foci of acute infarction. There is no evidence  for hemorrhagic transformation. No abnormal areas of contrast  enhancement are noted.     CTA HEAD: There is a dissection of the left cervical internal carotid  and this extends into the petrous segment of the left ICA. The dissected  cervical segment of the right ICA has a markedly irregular appearance  and the combination of the true and false lumens measure up to  approximately 9 mm in diameter. The dilated and irregular portions of  the false lumen are compatible with pseudoaneurysm formation. Overall,  the appearance of this dissection is very similar when compared to the  prior study and there is no convincing interval change. The true lumen  is markedly narrowed in diameter as it enters the skull base. The  thrombus within the dissection is decreased in its extent although I  suspect that there continues to be some thrombus within the false lumen.  The middle and anterior cerebral arteries are unremarkable in  appearance. The post PICA segment of the left vertebral artery is  hypoplastic. The right vertebral artery is unremarkable. The basilar  artery is within normal limits. The posterior cerebrals are  unremarkable. The P1 segments are relatively hypoplastic.     CT PERFUSION STUDY: The CT perfusion study demonstrates no significant  asymmetries on the time to maximum enhancement maps. Given the ages of  the infarcts, the CBF maps do not accurately portray the foci of  infarction.     IMPRESSION:     There is no convincing interval change in the extent of the foci of  acute infarction within the left MCA distribution which involve the left  parieto-occipital junction, the left parietal lobe, and the lateral  aspect of the left frontal lobe.     Again noted is a carotid dissection involving the left cervical internal  carotid artery and this  extends into the petrous segment of the left  ICA. Again, the dissected cervical segment of the right ICA has a  markedly irregular appearance and the overall combination of the size of  the true and false lumens measures up to approximately 9 mm in diameter.  The dilated and irregular appearance of the false lumen are compatible  with pseudoaneurysm formation. There is severe narrowing of the true  lumen at the level of the skull base. Overall, the appearance of this  dissection is very similar when compared to the previous study. There  were findings compatible with superimposed thrombus within the left  cervical internal carotid artery. This thrombus is diminished in extent  although I suspect that there continues to be some thrombus within the  false lumen of the left cervical ICA. The CT perfusion study does not  demonstrate any significant asymmetries in the time to maximum  enhancement maps. However, given the ages of the infarct, the CBF maps  do not accurately portray the foci of infarction.     Radiation dose reduction techniques were utilized, including automated  exposure control and exposure modulation based on body size.     This report was finalized on 4/16/2019 2:44 PM by Dr. Govind Ha M.D.     Echocardiogram Findings 4/15 TTE    Left Ventricle Left ventricular systolic function is normal. Calculated EF = 63.0%. Estimated EF was in agreement with the calculated EF. Estimated EF = 63%. Normal left ventricular cavity size and wall thickness noted. All left ventricular wall segments contract normally. Left ventricular diastolic function is normal.   Right Ventricle Normal right ventricular cavity size, wall thickness, systolic function and septal motion noted.   Left Atrium Left atrial volume is borderline increased.   Right Atrium Right atrial cavity size is borderline dilated. The inferior vena cava is normally sized. Normal IVC inspiratory collapse of greater than 50% noted.   Aortic Valve The  valve appears trileaflet. The aortic valve is abnormal in structure. There is moderate calcification of the aortic valve mainly affecting the left coronary cusp(s).No aortic valve regurgitation is present. No aortic valve stenosis is present.   Mitral Valve The mitral valve is normal in structure. No mitral valve regurgitation is present. No significant mitral valve stenosis is present.   Tricuspid Valve The tricuspid valve is normal. No tricuspid valve regurgitation is present.   Pulmonic Valve The pulmonic valve is grossly normal in structure. There is no pulmonic valve regurgitation present.   Greater Vessels No dilation of the aortic root is present. No dilation of the proximal aorta is present. The ascending aorta not well visualized.   Pericardium The pericardium is normal. There is no evidence of pericardial effusion.       Impression:  Mr Frazier is a 58 yo male with PMH HTN, HLP, and daily alcohol use who was transferred from [place] after he developed blurred vision of the left eye, ataxia, and right leg weakness.  He received IV TPA and then developed severe aphasia and flaccid right hemiplegia. Symptoms occurred after working in yard/riding tractor.    L hemisphere strokes secondary to L ICA dissection/thrombus s/p IV tPA  · Initial CTA head neck 4/14: Distal left ICA intraluminal filling defect with dissection and thrombus  · MRI brain without contrast 4/15: Several areas of acute to subacute infarct in the left cerebral hemisphere with the largest in the left parietal occipital region  · Repeat CTA head and neck, CTP: No significant change although thrombus does appear somewhat diminished, no mismatch on CTP  · TTE 4/15: EF 63%, borderline increased left atrial volume  · Heparin drip started after TPA given    Plan:  Start xarelto 20 mg daily and stop heparin gtt today  ASA 81 mg started this admission  Zocor 20 mg changed to Lipitor 80 mg  REJI eval as outpatient per pulmonology  Neurochecks  BP control-  gradually normalize to goal < 130/80 (SBP running 130s-150s)  Stroke Education  MIRA/SCDs  PT/OT/ST  CTA head and neck in 4-6 weeks and patient to follow-up with us (will arrange through our office), if no thrombus at that time we will recommend aspirin only  Ok to d/c home. Patient discussed with Dr Bagley today, 4/17.

## 2019-04-17 NOTE — PLAN OF CARE
Problem: Thrombolytic Therapy (Adult)  Goal: Signs and Symptoms of Listed Potential Problems Will be Absent, Minimized or Managed (Thrombolytic Therapy)  Outcome: Ongoing (interventions implemented as appropriate)      Problem: Stroke (Ischemic) (Adult)  Goal: Signs and Symptoms of Listed Potential Problems Will be Absent, Minimized or Managed (Stroke)  Outcome: Ongoing (interventions implemented as appropriate)      Problem: Fall Risk (Adult)  Goal: Identify Related Risk Factors and Signs and Symptoms  Outcome: Ongoing (interventions implemented as appropriate)    Goal: Absence of Fall  Outcome: Ongoing (interventions implemented as appropriate)      Problem: Patient Care Overview  Goal: Plan of Care Review  Outcome: Ongoing (interventions implemented as appropriate)      Problem: Skin Injury Risk (Adult)  Goal: Identify Related Risk Factors and Signs and Symptoms  Outcome: Ongoing (interventions implemented as appropriate)    Goal: Skin Health and Integrity  Outcome: Ongoing (interventions implemented as appropriate)

## 2019-04-17 NOTE — DISCHARGE SUMMARY
Date of Discharge:  4/17/2019    Discharge Diagnoses:  1. 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 Hypertension  3. Hyperlipidemia  4. Probable obstructive sleep apnea  5. Daily alcohol use      Hospital Course  Patient is a 59 y.o. male presented with patient presented with right hemiparesis and right homonymous hemianopsia he was administered TPA he has had dramatic improvement in his symptoms near complete resolution he was found to have a left internal carotid artery dissection and thrombus.  He is done extremely well he is been on heparin and stroke service is transitioned him to Xarelto and aspirin 81 mg/day he is to follow-up with the stroke service in 4-6 weeks they are arranging the appointment he is to follow-up with his primary care in a week.  He has hypertension we are resuming his home medications and I have advised him that he have any neurologic symptoms he is to return immediately is to see his primary care physician in about a week.  He had some witnessed apneas and his wife is noted significant snoring and possible apneas he is to get a sleep study that is been scheduled.      Procedures Performed         Consults:   Consults     No orders found from 3/16/2019 to 4/15/2019.          Pertinent Test Results:   Labs:      CrCl cannot be calculated (No order found.).      Results from last 7 days   Lab Units 04/17/19  0156 04/16/19  0330 04/15/19  0338   WBC 10*3/mm3 4.63 5.62 7.53   RBC 10*6/mm3 4.14 4.45 4.34   HEMOGLOBIN g/dL 13.1 14.2 13.9   HEMATOCRIT % 39.9 42.3 40.8   MCV fL 96.4 95.1 94.0   MCH pg 31.6 31.9 32.0   MCHC g/dL 32.8 33.6 34.1   RDW % 12.2* 12.3 12.6   RDW-SD fl 43.8 43.5 43.6   MPV fL 9.7 9.5 9.8   PLATELETS 10*3/mm3 214 218 223   NEUTROPHIL % % 41.7* 45.7 62.2   LYMPHOCYTE % % 46.2* 45.4* 29.5   MONOCYTES % % 8.9 6.6 7.3   EOSINOPHIL % % 2.4 1.4 0.4   BASOPHIL % % 0.6 0.7 0.5   IMM GRAN % % 0.2 0.2 0.1    NEUTROS ABS 10*3/mm3 1.93 2.57 4.68   LYMPHS ABS 10*3/mm3 2.14 2.55 2.22   MONOS ABS 10*3/mm3 0.41 0.37 0.55   EOS ABS 10*3/mm3 0.11 0.08 0.03   BASOS ABS 10*3/mm3 0.03 0.04 0.04   IMMATURE GRANS (ABS) 10*3/mm3 0.01 0.01 0.01   NRBC /100 WBC 0.0 0.0 0.0                         Results from last 7 days   Lab Units 04/14/19 2000   INR  1.12*       Imaging Results (last 72 hours)     Procedure Component Value Units Date/Time    CT Angiogram Head With Contrast [[id number]] Collected:  04/16/19 1133     Updated:  04/16/19 1447    Narrative:       CT ANGIOGRAM OF THE HEAD AND CT PERFUSION STUDY WITH CONTRAST     CLINICAL HISTORY: Vision loss in left eye.     TECHNIQUE: CT scan of the head was obtained with 3 mm axial images  before and after the administration of IV contrast. Sagittal, coronal,  and 3-dimensional reconstructed images were obtained. A CT perfusion  study was performed after the bolus administration of IV contrast.  Standard perfusion maps were constructed. Finally, a delayed  postcontrast head CT was performed with 3 mm axial images.     Comparison is made to prior CT angiogram and CT perfusion study dated  04/14/2019.     FINDINGS:        PRE AND POSTCONTRAST HEAD CT: There are foci of acute infarction within  the left superior parietal lobe and the left parieto-occipital junction.  The largest of these areas is seen within the left parieto-occipital  junction and measures up to approximately 2.1 x 1.1 cm in greatest axial  dimensions. Additional smaller areas are noted within the left  precentral gyrus, superior frontal gyrus, and middle frontal gyrus. The  largest of these areas measures up to approximately 7 mm in diameter.  These infarcts are all within the left MCA distribution and when  compared to the prior brain MRI of 04/15/2019, there is no convincing  evidence to suggest new foci of acute infarction. There is no evidence  for hemorrhagic transformation. No abnormal areas of  contrast  enhancement are noted.     CTA HEAD: There is a dissection of the left cervical internal carotid  and this extends into the petrous segment of the left ICA. The dissected  cervical segment of the right ICA has a markedly irregular appearance  and the combination of the true and false lumens measure up to  approximately 9 mm in diameter. The dilated and irregular portions of  the false lumen are compatible with pseudoaneurysm formation. Overall,  the appearance of this dissection is very similar when compared to the  prior study and there is no convincing interval change. The true lumen  is markedly narrowed in diameter as it enters the skull base. The  thrombus within the dissection is decreased in its extent although I  suspect that there continues to be some thrombus within the false lumen.  The middle and anterior cerebral arteries are unremarkable in  appearance. The post PICA segment of the left vertebral artery is  hypoplastic. The right vertebral artery is unremarkable. The basilar  artery is within normal limits. The posterior cerebrals are  unremarkable. The P1 segments are relatively hypoplastic.     CT PERFUSION STUDY: The CT perfusion study demonstrates no significant  asymmetries on the time to maximum enhancement maps. Given the ages of  the infarcts, the CBF maps do not accurately portray the foci of  infarction.       Impression:          There is no convincing interval change in the extent of the foci of  acute infarction within the left MCA distribution which involve the left  parieto-occipital junction, the left parietal lobe, and the lateral  aspect of the left frontal lobe.     Again noted is a carotid dissection involving the left cervical internal  carotid artery and this extends into the petrous segment of the left  ICA. Again, the dissected cervical segment of the right ICA has a  markedly irregular appearance and the overall combination of the size of  the true and false lumens  measures up to approximately 9 mm in diameter.  The dilated and irregular appearance of the false lumen are compatible  with pseudoaneurysm formation. There is severe narrowing of the true  lumen at the level of the skull base. Overall, the appearance of this  dissection is very similar when compared to the previous study. There  were findings compatible with superimposed thrombus within the left  cervical internal carotid artery. This thrombus is diminished in extent  although I suspect that there continues to be some thrombus within the  false lumen of the left cervical ICA. The CT perfusion study does not  demonstrate any significant asymmetries in the time to maximum  enhancement maps. However, given the ages of the infarct, the CBF maps  do not accurately portray the foci of infarction.     Radiation dose reduction techniques were utilized, including automated  exposure control and exposure modulation based on body size.     This report was finalized on 4/16/2019 2:44 PM by Dr. Govind Ha M.D.       CT Cerebral Perfusion With & Without Contrast [[id number]] Collected:  04/16/19 1133     Updated:  04/16/19 1447    Narrative:       CT ANGIOGRAM OF THE HEAD AND CT PERFUSION STUDY WITH CONTRAST     CLINICAL HISTORY: Vision loss in left eye.     TECHNIQUE: CT scan of the head was obtained with 3 mm axial images  before and after the administration of IV contrast. Sagittal, coronal,  and 3-dimensional reconstructed images were obtained. A CT perfusion  study was performed after the bolus administration of IV contrast.  Standard perfusion maps were constructed. Finally, a delayed  postcontrast head CT was performed with 3 mm axial images.     Comparison is made to prior CT angiogram and CT perfusion study dated  04/14/2019.     FINDINGS:        PRE AND POSTCONTRAST HEAD CT: There are foci of acute infarction within  the left superior parietal lobe and the left parieto-occipital junction.  The largest of these areas  is seen within the left parieto-occipital  junction and measures up to approximately 2.1 x 1.1 cm in greatest axial  dimensions. Additional smaller areas are noted within the left  precentral gyrus, superior frontal gyrus, and middle frontal gyrus. The  largest of these areas measures up to approximately 7 mm in diameter.  These infarcts are all within the left MCA distribution and when  compared to the prior brain MRI of 04/15/2019, there is no convincing  evidence to suggest new foci of acute infarction. There is no evidence  for hemorrhagic transformation. No abnormal areas of contrast  enhancement are noted.     CTA HEAD: There is a dissection of the left cervical internal carotid  and this extends into the petrous segment of the left ICA. The dissected  cervical segment of the right ICA has a markedly irregular appearance  and the combination of the true and false lumens measure up to  approximately 9 mm in diameter. The dilated and irregular portions of  the false lumen are compatible with pseudoaneurysm formation. Overall,  the appearance of this dissection is very similar when compared to the  prior study and there is no convincing interval change. The true lumen  is markedly narrowed in diameter as it enters the skull base. The  thrombus within the dissection is decreased in its extent although I  suspect that there continues to be some thrombus within the false lumen.  The middle and anterior cerebral arteries are unremarkable in  appearance. The post PICA segment of the left vertebral artery is  hypoplastic. The right vertebral artery is unremarkable. The basilar  artery is within normal limits. The posterior cerebrals are  unremarkable. The P1 segments are relatively hypoplastic.     CT PERFUSION STUDY: The CT perfusion study demonstrates no significant  asymmetries on the time to maximum enhancement maps. Given the ages of  the infarcts, the CBF maps do not accurately portray the foci of  infarction.        Impression:          There is no convincing interval change in the extent of the foci of  acute infarction within the left MCA distribution which involve the left  parieto-occipital junction, the left parietal lobe, and the lateral  aspect of the left frontal lobe.     Again noted is a carotid dissection involving the left cervical internal  carotid artery and this extends into the petrous segment of the left  ICA. Again, the dissected cervical segment of the right ICA has a  markedly irregular appearance and the overall combination of the size of  the true and false lumens measures up to approximately 9 mm in diameter.  The dilated and irregular appearance of the false lumen are compatible  with pseudoaneurysm formation. There is severe narrowing of the true  lumen at the level of the skull base. Overall, the appearance of this  dissection is very similar when compared to the previous study. There  were findings compatible with superimposed thrombus within the left  cervical internal carotid artery. This thrombus is diminished in extent  although I suspect that there continues to be some thrombus within the  false lumen of the left cervical ICA. The CT perfusion study does not  demonstrate any significant asymmetries in the time to maximum  enhancement maps. However, given the ages of the infarct, the CBF maps  do not accurately portray the foci of infarction.     Radiation dose reduction techniques were utilized, including automated  exposure control and exposure modulation based on body size.     This report was finalized on 4/16/2019 2:44 PM by Dr. Govind Ha M.D.       MRI Brain Without Contrast [[id number]] Collected:  04/15/19 1638     Updated:  04/16/19 0950    Narrative:       MRI BRAIN WITHOUT CONTRAST-  04/15/2019     HISTORY: Left facial numbness. Speech difficulty.     TECHNIQUE: Multiple sagittal and axial noncontrast images were obtained  through the brain.     FINDINGS: On the diffusion-weighted  images, there is a ill-defined area  of bright signal intensity in the left parieto-occipital region  measuring approximately 2.4 cm. This is consistent with an area of acute  to subacute infarct. Several additional tiny areas of bright signal  intensity are seen in the left superior frontoparietal region on the  diffusion-weighted images. These are consistent with additional tiny  acute to subacute infarcts.     No intracranial hemorrhage is seen. Brain parenchyma and ventricular  system otherwise appear within normal limits.     Craniocervical junction and sella appear normal.     FLAIR images show scattered tiny foci of bright signal intensity in the  bilateral cerebral white matter, consistent with minimal small vessel  white matter ischemic disease.     Normal-appearing vascular flow voids are seen.       Impression:       1. Several areas of acute to subacute infarct in the left cerebral  hemisphere with the largest in the left parieto-occipital region  measuring 2.4 cm. Please see full discussion above.  2. No evidence of intracranial hemorrhage.     This report was finalized on 4/16/2019 9:46 AM by Dr. Kali Johnston M.D.       CT Angiogram Head With & Without Contrast [[id number]] Collected:  04/14/19 1908     Updated:  04/15/19 0724    Narrative:       CT HEAD WITHOUT CONTRAST, CT ANGIOGRAM HEAD AND NECK WITH IV CONTRAST,  CT CEREBRAL PERFUSION EXAM WITH AND WITHOUT CONTRAST.     TECHNIQUE: Head CT as obtained without IV contrast. CT angiogram of the  head and neck was obtained with IV contrast in the arterial phase and  this data was reconstructed in coronal and sagittal planes. 3-D volume  rendering was performed. CT cerebral perfusion exam was obtained with  and without IV contrast.     FINDINGS: Noncontrasted head CT demonstrates no abnormal area of  increased attenuation tracks to suggest hemorrhage. No extra-axial fluid  collection is observed. There is no mass effect or shift of  midline  structures.  No extraaxial fluid collection is evident. Ventricles  appear within normal limits for size and configuration.     Within the distal cervical internal carotid artery there is a linear  internal filling defect that begins at the horizontal level of the C1  ring. Linear, irregular filling defect extends cephalad through the  petrous segment to the junction of the petrous and cavernous segments of  the left ICA. This linear filling defect has the appearance of a  dissection associated with areas of thrombus up to moderate stenosis  without complete stenosis or occlusion. The cavernous and supracavernous  left ICA is patent.     There is atherosclerotic calcified plaque involving the carotid bulbs  and both ICA origins without NASCET stenosis of the proximal ICAs or  carotid bulbs. Both common carotid arteries are patent. The right  cervical ICA is patent. The right petrous and cavernous and  supracavernous ICA is patent. Both ACAs and MCAs and major branches  appear patent.     The right vertebral artery is larger than the left. There is flow within  both vertebral arteries. The left vertebral artery terminates in the  left PICA. There is flow within the intracranial right vertebral artery.  Basilar artery is somewhat small. There is a fetal origin of the left  posterior cerebral artery. Both posterior cerebral arteries are patent.     CT cerebral perfusion exam with Rapid software demonstrates 0 mL where  there is CBF less than 30% volume and 0 mL where there is Tmax greater  than 6 seconds. With a cut off Tmax greater than 4 seconds, there is a  slight delayed perfusion to the region of the left parietal lobe and to  lesser degree within the right frontal lobe. The postcontrast enhanced  head CT demonstrates no abnormal intracranial enhancement. Within the  medial left maxillary sinus there is an 8 mm mucous retention cyst.       Impression:       1. Linear intraluminal filling defect within the  distal left cervical  and petrous segments of the left internal carotid artery begins at the  horizontal level of the C1 ring and extends to the junction of the  petrous and cavernous segments.   The appearance is consistent with a  dissection with associated thrombus and is associated with moderate  narrowing.  2. CT cerebral perfusion exam demonstrates no focal areas where there is  CBF less than 30% volume or Tmax greater than 6 seconds. Noncontrasted  head CT demonstrates no evidence for intracranial hemorrhage or acute  abnormality. MRI evaluation may be helpful to evaluate acute stroke in  this patient with right-sided weakness.     Findings of a noncontrast exam discussed with Dr. Bagley while the  patient was on the CT table at 1740. Findings of a contrast exam  discussed with Dr. Bagley at 1815 p.m.     This report was finalized on 4/15/2019 7:20 AM by Dr. Francois Perry M.D.       CT Angiogram Neck With & Without Contrast [[id number]] Collected:  04/14/19 1908     Updated:  04/15/19 0724    Narrative:       CT HEAD WITHOUT CONTRAST, CT ANGIOGRAM HEAD AND NECK WITH IV CONTRAST,  CT CEREBRAL PERFUSION EXAM WITH AND WITHOUT CONTRAST.     TECHNIQUE: Head CT as obtained without IV contrast. CT angiogram of the  head and neck was obtained with IV contrast in the arterial phase and  this data was reconstructed in coronal and sagittal planes. 3-D volume  rendering was performed. CT cerebral perfusion exam was obtained with  and without IV contrast.     FINDINGS: Noncontrasted head CT demonstrates no abnormal area of  increased attenuation tracks to suggest hemorrhage. No extra-axial fluid  collection is observed. There is no mass effect or shift of midline  structures.  No extraaxial fluid collection is evident. Ventricles  appear within normal limits for size and configuration.     Within the distal cervical internal carotid artery there is a linear  internal filling defect that begins at the horizontal  level of the C1  ring. Linear, irregular filling defect extends cephalad through the  petrous segment to the junction of the petrous and cavernous segments of  the left ICA. This linear filling defect has the appearance of a  dissection associated with areas of thrombus up to moderate stenosis  without complete stenosis or occlusion. The cavernous and supracavernous  left ICA is patent.     There is atherosclerotic calcified plaque involving the carotid bulbs  and both ICA origins without NASCET stenosis of the proximal ICAs or  carotid bulbs. Both common carotid arteries are patent. The right  cervical ICA is patent. The right petrous and cavernous and  supracavernous ICA is patent. Both ACAs and MCAs and major branches  appear patent.     The right vertebral artery is larger than the left. There is flow within  both vertebral arteries. The left vertebral artery terminates in the  left PICA. There is flow within the intracranial right vertebral artery.  Basilar artery is somewhat small. There is a fetal origin of the left  posterior cerebral artery. Both posterior cerebral arteries are patent.     CT cerebral perfusion exam with Rapid software demonstrates 0 mL where  there is CBF less than 30% volume and 0 mL where there is Tmax greater  than 6 seconds. With a cut off Tmax greater than 4 seconds, there is a  slight delayed perfusion to the region of the left parietal lobe and to  lesser degree within the right frontal lobe. The postcontrast enhanced  head CT demonstrates no abnormal intracranial enhancement. Within the  medial left maxillary sinus there is an 8 mm mucous retention cyst.       Impression:       1. Linear intraluminal filling defect within the distal left cervical  and petrous segments of the left internal carotid artery begins at the  horizontal level of the C1 ring and extends to the junction of the  petrous and cavernous segments.   The appearance is consistent with a  dissection with associated  thrombus and is associated with moderate  narrowing.  2. CT cerebral perfusion exam demonstrates no focal areas where there is  CBF less than 30% volume or Tmax greater than 6 seconds. Noncontrasted  head CT demonstrates no evidence for intracranial hemorrhage or acute  abnormality. MRI evaluation may be helpful to evaluate acute stroke in  this patient with right-sided weakness.     Findings of a noncontrast exam discussed with Dr. Bagley while the  patient was on the CT table at 1740. Findings of a contrast exam  discussed with Dr. Bagley at 1815 p.m.     This report was finalized on 4/15/2019 7:20 AM by Dr. Francois Perry M.D.       CT Cerebral Perfusion With & Without Contrast [[id number]] Collected:  04/14/19 1908     Updated:  04/15/19 0724    Narrative:       CT HEAD WITHOUT CONTRAST, CT ANGIOGRAM HEAD AND NECK WITH IV CONTRAST,  CT CEREBRAL PERFUSION EXAM WITH AND WITHOUT CONTRAST.     TECHNIQUE: Head CT as obtained without IV contrast. CT angiogram of the  head and neck was obtained with IV contrast in the arterial phase and  this data was reconstructed in coronal and sagittal planes. 3-D volume  rendering was performed. CT cerebral perfusion exam was obtained with  and without IV contrast.     FINDINGS: Noncontrasted head CT demonstrates no abnormal area of  increased attenuation tracks to suggest hemorrhage. No extra-axial fluid  collection is observed. There is no mass effect or shift of midline  structures.  No extraaxial fluid collection is evident. Ventricles  appear within normal limits for size and configuration.     Within the distal cervical internal carotid artery there is a linear  internal filling defect that begins at the horizontal level of the C1  ring. Linear, irregular filling defect extends cephalad through the  petrous segment to the junction of the petrous and cavernous segments of  the left ICA. This linear filling defect has the appearance of a  dissection associated with areas  of thrombus up to moderate stenosis  without complete stenosis or occlusion. The cavernous and supracavernous  left ICA is patent.     There is atherosclerotic calcified plaque involving the carotid bulbs  and both ICA origins without NASCET stenosis of the proximal ICAs or  carotid bulbs. Both common carotid arteries are patent. The right  cervical ICA is patent. The right petrous and cavernous and  supracavernous ICA is patent. Both ACAs and MCAs and major branches  appear patent.     The right vertebral artery is larger than the left. There is flow within  both vertebral arteries. The left vertebral artery terminates in the  left PICA. There is flow within the intracranial right vertebral artery.  Basilar artery is somewhat small. There is a fetal origin of the left  posterior cerebral artery. Both posterior cerebral arteries are patent.     CT cerebral perfusion exam with Rapid software demonstrates 0 mL where  there is CBF less than 30% volume and 0 mL where there is Tmax greater  than 6 seconds. With a cut off Tmax greater than 4 seconds, there is a  slight delayed perfusion to the region of the left parietal lobe and to  lesser degree within the right frontal lobe. The postcontrast enhanced  head CT demonstrates no abnormal intracranial enhancement. Within the  medial left maxillary sinus there is an 8 mm mucous retention cyst.       Impression:       1. Linear intraluminal filling defect within the distal left cervical  and petrous segments of the left internal carotid artery begins at the  horizontal level of the C1 ring and extends to the junction of the  petrous and cavernous segments.   The appearance is consistent with a  dissection with associated thrombus and is associated with moderate  narrowing.  2. CT cerebral perfusion exam demonstrates no focal areas where there is  CBF less than 30% volume or Tmax greater than 6 seconds. Noncontrasted  head CT demonstrates no evidence for intracranial  hemorrhage or acute  abnormality. MRI evaluation may be helpful to evaluate acute stroke in  this patient with right-sided weakness.     Findings of a noncontrast exam discussed with Dr. Bagley while the  patient was on the CT table at 1740. Findings of a contrast exam  discussed with Dr. Bagley at 1815 p.m.     This report was finalized on 4/15/2019 7:20 AM by Dr. Francois Perry M.D.       CT Head Without Contrast [[id number]] Collected:  04/14/19 2043     Updated:  04/14/19 2224    Narrative:       CRANIAL CT SCAN WITHOUT CONTRAST     CLINICAL HISTORY: Stroke     COMPARISON: 4:35 PM, outside exam.     TECHNIQUE: Radiation dose reduction techniques were utilized, including  automated exposure control and exposure modulation based on body size.  Multiple axial images of the head were obtained without contrast.      FINDINGS:  There is residual iodinated contrast from prior CT angiogram.  No acute hemorrhage is appreciated considering the presence of residual  contrast. There is no mass effect or midline shift identified.   Ventricles, sulci, and cisterns appear normal. Bone window images show  chronic appearing paranasal sinus disease, greatest in the left  maxillary.                Impression:       1. Residual iodinated contrast is noted, no acute intracranial finding  or CT evidence of acute CVA.                 This report was finalized on 4/14/2019 10:21 PM by Luis E Peña M.D.           Results for orders placed during the hospital encounter of 04/14/19   Adult Transthoracic Echo Complete W/ Cont if Necessary Per Protocol (With Agitated Saline)    Narrative · Left ventricular systolic function is normal. Calculated EF = 63.0%.   Estimated EF was in agreement with the calculated EF. Estimated EF = 63%.   Normal left ventricular cavity size and wall thickness noted. All left   ventricular wall segments contract normally. Left ventricular diastolic   function is normal.  · Left atrial volume is borderline  increased. Right atrial cavity size is   borderline dilated.  · The aortic valve is abnormal in structure. There is moderate   calcification of the aortic valve mainly affecting the left coronary   cusp(s).No aortic valve regurgitation is present. No aortic valve stenosis   is present.              Condition on Discharge: Dramatically improved    Vital Signs  Temp:  [97.6 °F (36.4 °C)-98.9 °F (37.2 °C)] 97.6 °F (36.4 °C)  Heart Rate:  [66-72] 72  Resp:  [16] 16  BP: (136-145)/(81-97) 145/81    Physical Exam:  General Appearance: Well-developed white male he is sitting up in a chair in no apparent distress  Eyes: Conjunctiva are clear and anicteric his right pupil is about half a millimeter larger than the left both react to light.  Extraocular muscles are intact and visual fields are intact to confrontation now  ENT: No facial asymmetry tongue is midline mucous membranes are moist  Neck: No adenopathy or thyromegaly no jugular venous distention trachea midline  Lungs: Clear nonlabored symmetric expansion no wheezes rales or rhonchi  Cardiac: Regular rate rhythm no murmur gallop  Abdomen: Soft nontender no palpable organomegaly or masses  : Not examined  Musc/Skel: Grossly normal  Skin: No jaundice no petechiae  Neuro: He is alert and oriented x3 he is cooperative he is ambulating well without any difficulties heel to shin without difficulty good dorsiflexion plantarflexion.  No pronator drift good  bilaterally finger to nose without difficulty  Extremities/P Vascular: No clubbing no cyanosis no edema palpable radial dorsalis pedis pulses  MSE: Seems to be in pretty good spirits      Discharge Disposition  Home or Self Care    Discharge Medications     Discharge Medications      New Medications      Instructions Start Date   aspirin 81 MG chewable tablet   81 mg, Oral, Daily   Start Date:  4/18/2019     atorvastatin 80 MG tablet  Commonly known as:  LIPITOR   80 mg, Oral, Nightly      rivaroxaban 20 MG  tablet  Commonly known as:  XARELTO   20 mg, Oral, Daily With Dinner   Start Date:  4/18/2019        Continue These Medications      Instructions Start Date   amLODIPine 2.5 MG tablet  Commonly known as:  NORVASC   2.5 mg, Oral, Daily      lisinopril 20 MG tablet  Commonly known as:  PRINIVIL,ZESTRIL   20 mg, Oral, Daily         Stop These Medications    simvastatin 20 MG tablet  Commonly known as:  ZOCOR            Discharge Diet: Healthy heart    Activity at Discharge:     Follow-up Appointments  No future appointments.      Test Results Pending at Discharge       Ousmane Blackburn MD  04/17/19  5:56 PM    Time:

## 2019-04-18 ENCOUNTER — READMISSION MANAGEMENT (OUTPATIENT)
Dept: CALL CENTER | Facility: HOSPITAL | Age: 59
End: 2019-04-18

## 2019-04-18 NOTE — OUTREACH NOTE
Prep Survey      Responses   Facility patient discharged from?  [facility]   Is patient eligible?  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  Stroke (TIA)   Does the patient have Home health ordered?  No   Is there a DME ordered?  No   Comments regarding appointments  See AVS   Prep survey completed?  Yes          Lana Leno RN

## 2019-04-19 ENCOUNTER — READMISSION MANAGEMENT (OUTPATIENT)
Dept: CALL CENTER | Facility: HOSPITAL | Age: 59
End: 2019-04-19

## 2019-04-19 NOTE — OUTREACH NOTE
Stroke Week 1 Survey      Responses   Facility patient discharged from?  [facility]   Does the patient have one of the following disease processes/diagnoses(primary or secondary)?  Stroke (TIA)   Is there a successful TCM telephone encounter documented?  No   Week 1 attempt successful?  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  Yes   List who call center can speak with  Lana Frazier, spouse   Person spoke with today (if not patient) and relationship  spouse, Mrs. Frazier   Meds reviewed with patient/caregiver?  Yes   Is the patient having any side effects they believe may be caused by any medication additions or changes?  No   Does the patient have all medications ordered at discharge?  Yes   Is the patient taking all medications as directed (includes completed medication regime)?  Yes   Does the patient have a primary care provider?   Yes   Does the patient have an appointment with their PCP within 7 days of discharge?  No   Comments regarding PCP  HAYDER Trent    Nursing Interventions  Educated patient on importance of making appointment, Advised patient to make appointment   Has the patient kept scheduled appointments due by today?  N/A   Comments  Advised to make the 4 week follow up with Dr Bagley   Has home health visited the patient within 72 hours of discharge?  N/A   Psychosocial issues?  No   Does the patient require any assistance with activities of daily living such as eating, bathing, dressing, walking, etc.?  No   Does the patient have any residual symptoms from stroke/TIA?  No   Does the patient understand the diet ordered at discharge?  Yes   Did the patient receive a copy of their discharge instructions?  Yes   Nursing interventions  Reviewed instructions with  patient   What is the patient's perception of their health status since discharge?  Improving   Nursing interventions  Nurse provided patient education   Is the patient able to teach back FAST for Stroke?  Yes   Is the patient/caregiver able to teach back the risk factors for a stroke?  High blood pressure-goal below 120/80, High Cholesterol, Sleep apnea   Is the patient/caregiver able to teach back signs and symptoms related to disease process for when to call PCP?  Yes   Is the patient/caregiver able to teach back signs and symptoms related to disease process for when to call 911?  Yes   Is the patient/caregiver able to teach back the hierarchy of who to call/visit for symptoms/problems? PCP, Specialist, Home health nurse, Urgent Care, ED, 911  Yes   Week 1 call completed?  Yes          Lana Hernández RN

## 2019-04-23 DIAGNOSIS — I63.039 CEREBROVASCULAR ACCIDENT (CVA) DUE TO THROMBOSIS OF CAROTID ARTERY, UNSPECIFIED BLOOD VESSEL LATERALITY (HCC): Primary | ICD-10-CM

## 2019-04-29 ENCOUNTER — READMISSION MANAGEMENT (OUTPATIENT)
Dept: CALL CENTER | Facility: HOSPITAL | Age: 59
End: 2019-04-29

## 2019-04-29 NOTE — OUTREACH NOTE
Stroke Week 1 Survey      Responses   Facility patient discharged from?  [facility]   Does the patient have one of the following disease processes/diagnoses(primary or secondary)?  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  Yes   Is the patient having any side effects they believe may be caused by any medication additions or changes?  No   Does the patient have all medications ordered at discharge?  Yes   Is the patient taking all medications as directed (includes completed medication regime)?  Yes   Does the patient have a primary care provider?   Yes   Does the patient have an appointment with their PCP within 7 days of discharge?  Yes   Has the patient kept scheduled appointments due by today?  Yes   Comments  He will reschedule sleep study. Seen PCP today. Has Neuro appt.   Has home health visited the patient within 72 hours of discharge?  N/A   Psychosocial issues?  No   Does the patient require any assistance with activities of daily living such as eating, bathing, dressing, walking, etc.?  No   Does the patient have any residual symptoms from stroke/TIA?  No   Does the patient understand the diet ordered at discharge?  Yes   Did the patient receive a copy of their discharge instructions?  Yes   Nursing interventions  Reviewed instructions with patient   What is the patient's perception of their health status since discharge?  Improving   Nursing interventions  Nurse provided patient education   Is the patient able to teach back FAST for Stroke?  Yes   Is the patient/caregiver able to teach back the risk factors for a stroke?  High blood pressure-goal below 120/80, High Cholesterol, Sleep apnea   Is the patient/caregiver able to teach back signs and symptoms related to disease process for when to  call PCP?  Yes   Is the patient/caregiver able to teach back signs and symptoms related to disease process for when to call 911?  Yes   Is the patient/caregiver able to teach back the hierarchy of who to call/visit for symptoms/problems? PCP, Specialist, Home health nurse, Urgent Care, ED, 911  Yes          Tate Michael RN

## 2019-05-01 ENCOUNTER — TELEPHONE (OUTPATIENT)
Dept: NEUROLOGY | Facility: CLINIC | Age: 59
End: 2019-05-01

## 2019-05-01 NOTE — TELEPHONE ENCOUNTER
Two Week Stroke Phone Call  Spoke with the patient  · Admission Date: 4/14/2019  · Discharge Date: 4/17/2019  · Discharge Destination:Home  · Meds reviewed with patient/caregiver?    [x]Yes [] No   o Antiplatelet: Aspirin  - Understands purpose     [x]  Yes     []  No     - Understands how to take      [x]  Yes     []  No    o Cholesterol Reducing: Lipitor  - Understands purpose     [x]  Yes     []  No    - Understands how to take      [x]  Yes     []  No   o Anit-Coagulate: Xarelto   - Understands purpose     [x]  Yes     []  No    - Understands how to take      [x]  Yes     []  No     · Is the patient taking all medication as directed?   [x]  Yes  []  No  · Discussed personal risk factors   [x]  Yes []  No    o High blood pressure   - Bp goal <130/80  o Diabetes   o High cholesterol   - Review desired LDL goal <70  • Discussed signs and symptoms of stroke and when patient to call 911?   [x]  Yes []  No  o Sudden weakness or numbness of the face, arm, or leg especially on one side of the body  o Sudden confusion, trouble speaking or understanding  o Sudden trouble seeing in one or both eyes   o Sudden trouble walking, dizziness, loss of balance or coordination  o Sudden severe headaches with no known cause    Notified Patient that if any of these symptoms occur to call 911  · Does the patient have any new signs or symptoms of a stroke?   []  Yes     [x]  No  · Does the patient have 3 month Stroke Clinic appointment?  Scheduled patient to have CTA H/N and follow up with Dr. Bagley. New patient packet  emailed to patient per request.   · Is the patient currently in therapy, outpatient, or home health?  []  Yes     [x]  No    Needs a referral?      []  Yes     []  No  Patient Satisfaction   Patient was in a hurry and did not wish to answer

## 2019-05-01 NOTE — TELEPHONE ENCOUNTER
----- Message from Sandrine Arreola MA sent at 4/23/2019  4:09 PM EDT -----      ----- Message -----  From: Ama Sethi APRN  Sent: 4/17/2019   9:02 AM  To: Sandrine Arreola MA    This patient needs CTA head and neck in 4-6 weeks and then follow-up with Dr. Mckeon or myself. Thanks

## 2019-05-14 DIAGNOSIS — I82.90 THROMBUS: ICD-10-CM

## 2019-05-14 DIAGNOSIS — E78.2 MIXED HYPERLIPIDEMIA: ICD-10-CM

## 2019-05-14 DIAGNOSIS — I63.412 CEREBROVASCULAR ACCIDENT (CVA) DUE TO EMBOLISM OF LEFT MIDDLE CEREBRAL ARTERY (HCC): Primary | ICD-10-CM

## 2019-05-14 DIAGNOSIS — I77.71 CAROTID ARTERY DISSECTION (HCC): ICD-10-CM

## 2019-05-14 RX ORDER — ATORVASTATIN CALCIUM 80 MG/1
80 TABLET, FILM COATED ORAL NIGHTLY
Qty: 30 TABLET | Refills: 0 | Status: SHIPPED | OUTPATIENT
Start: 2019-05-14 | End: 2019-06-10 | Stop reason: SDUPTHER

## 2019-06-04 ENCOUNTER — HOSPITAL ENCOUNTER (OUTPATIENT)
Dept: CT IMAGING | Facility: HOSPITAL | Age: 59
Discharge: HOME | End: 2019-06-04
Admitting: NURSE PRACTITIONER

## 2019-06-04 DIAGNOSIS — I63.039 CEREBROVASCULAR ACCIDENT (CVA) DUE TO THROMBOSIS OF CAROTID ARTERY, UNSPECIFIED BLOOD VESSEL LATERALITY (HCC): ICD-10-CM

## 2019-06-04 LAB — CREAT BLDA-MCNC: 0.9 MG/DL (ref 0.6–1.3)

## 2019-06-04 PROCEDURE — 0 IOPAMIDOL PER 1 ML: Performed by: NURSE PRACTITIONER

## 2019-06-04 PROCEDURE — 70498 CT ANGIOGRAPHY NECK: CPT

## 2019-06-04 PROCEDURE — 70496 CT ANGIOGRAPHY HEAD: CPT

## 2019-06-04 PROCEDURE — 82565 ASSAY OF CREATININE: CPT

## 2019-06-04 RX ADMIN — IOPAMIDOL 95 ML: 755 INJECTION, SOLUTION INTRAVENOUS at 11:19

## 2019-06-05 ENCOUNTER — OFFICE VISIT (OUTPATIENT)
Dept: NEUROLOGY | Facility: CLINIC | Age: 59
End: 2019-06-05

## 2019-06-05 VITALS
BODY MASS INDEX: 25.2 KG/M2 | WEIGHT: 180 LBS | HEIGHT: 71 IN | OXYGEN SATURATION: 98 % | DIASTOLIC BLOOD PRESSURE: 84 MMHG | SYSTOLIC BLOOD PRESSURE: 148 MMHG | HEART RATE: 65 BPM

## 2019-06-05 DIAGNOSIS — I63.232 CEREBROVASCULAR ACCIDENT (CVA) DUE TO STENOSIS OF LEFT CAROTID ARTERY (HCC): Primary | ICD-10-CM

## 2019-06-05 DIAGNOSIS — F32.9 REACTIVE DEPRESSION: ICD-10-CM

## 2019-06-05 DIAGNOSIS — E78.2 MIXED HYPERLIPIDEMIA: ICD-10-CM

## 2019-06-05 DIAGNOSIS — I77.71 CAROTID ARTERY DISSECTION (HCC): ICD-10-CM

## 2019-06-05 DIAGNOSIS — I10 ESSENTIAL HYPERTENSION: ICD-10-CM

## 2019-06-05 DIAGNOSIS — I82.90 THROMBUS: ICD-10-CM

## 2019-06-05 DIAGNOSIS — I63.412 CEREBROVASCULAR ACCIDENT (CVA) DUE TO EMBOLISM OF LEFT MIDDLE CEREBRAL ARTERY (HCC): ICD-10-CM

## 2019-06-05 PROCEDURE — 99214 OFFICE O/P EST MOD 30 MIN: CPT | Performed by: PSYCHIATRY & NEUROLOGY

## 2019-06-05 RX ORDER — ESCITALOPRAM OXALATE 10 MG/1
10 TABLET ORAL DAILY
Qty: 30 TABLET | Refills: 2 | Status: SHIPPED | OUTPATIENT
Start: 2019-06-05 | End: 2020-06-04

## 2019-06-05 NOTE — PROGRESS NOTES
"DOS: 2019  NAME: Neeraj Frazier   : 1960  PCP: Destini Castillo APRN  Chief Complaint   Patient presents with   • Stroke       Chief complaint: Stroke follow-up  Subjective: 59-year-old man with hypertension, hyperlipidemia, otherwise healthy.  He presented to [facility], [place], operated by [facility] 2 months ago with transient monocular blindness and speech difficulty.  He initially went to [place] and was transferred here for left carotid dissection with thrombus.  He was heparinized and subsequently did well and went home with no residual deficits except for a left Flaco's.  Since then he has had no recurrent symptoms except for occasional mild word finding difficulty.  He does have mild decrease in the peripheral vision of his left eye that is been confirmed by his ophthalmologist.  He has occasional pulsatile tinnitus in the left ear.  Otherwise he feels well with no neck pain or any spells of right-sided weakness.  Follow-up CTA was done yesterday.  He also reports symptoms of depression and anxiety which has started since his stroke.    Objective:   Vital signs: /84 (BP Location: Left arm, Patient Position: Sitting, Cuff Size: Adult)   Pulse 65   Ht 180.3 cm (71\")   Wt 81.6 kg (180 lb)   SpO2 98%   BMI 25.10 kg/m²    Gen: NAD, vitals reviewed  MS: oriented x3, recent/remote memory intact, normal attention/concentration, language intact, no neglect.  CN: visual acuity grossly normal, right pupil 3 mm, left pupil 2 mm, reactive, left sided ptosis, EOMI, no facial droop, no dysarthria  Motor: 5/5 throughout upper and lower extremities, normal tone  Sensory: intact to light touch all 4 ext.    Review of Systems   Constitutional: Negative for activity change, appetite change and unexpected weight change.   HENT: Negative for facial swelling, trouble swallowing and voice change.    Eyes: Negative for photophobia, pain and visual disturbance.   Respiratory: Negative for chest tightness, shortness of breath and wheezing.  "   Cardiovascular: Negative for chest pain, palpitations and leg swelling.   Gastrointestinal: Negative for abdominal pain, nausea and vomiting.   Endocrine: Negative for polydipsia and polyphagia.   Musculoskeletal: Negative for arthralgias, back pain, gait problem, joint swelling, myalgias, neck pain and neck stiffness.   Neurological: Positive for speech difficulty (finding words). Negative for dizziness, tremors, seizures, syncope, facial asymmetry, weakness, light-headedness, numbness and headaches.   Hematological: Does not bruise/bleed easily.   Psychiatric/Behavioral: Negative for agitation, behavioral problems, confusion, decreased concentration, dysphoric mood, hallucinations, self-injury, sleep disturbance and suicidal ideas. The patient is not nervous/anxious and is not hyperactive.          Laboratory results:  Lab Results   Component Value Date    CREATININE 0.90 06/04/2019     Lab Results   Component Value Date    WBC 4.63 04/17/2019    HGB 13.1 04/17/2019    HCT 39.9 04/17/2019    MCV 96.4 04/17/2019     04/17/2019     Lab Results   Component Value Date    CHOL 162 04/15/2019     Lab Results   Component Value Date    HDL 66 (H) 04/15/2019     Lab Results   Component Value Date    LDL 80 04/15/2019     Lab Results   Component Value Date    TRIG 82 04/15/2019     @[email or web address]@     Review of labs: LDL 80    Review and interpretation of imaging: I personally reviewed his follow-up CTA head and neck performed yesterday which shows slight progression of his left carotid dissection at the skull base, there appears to be a persistent filling defect.  Per the radiology report the thrombus has resolved but the dissection has slightly extended.    Workup to date: Left middle cerebral artery stroke due to left carotid dissection with mural thrombus  MRI 4/2019: Patchy left MCA stroke  CTA 4/2019: Left ICA dissection with mural thrombus  CTA 6/2018: Slight extension of left ICA dissection, thrombus seems to have  resolved    Impression:  1.  Left carotid dissection  2.  Left middle cerebral artery stroke, due to thrombus from left carotid dissection  3.  Essential hypertension  4.  Mixed hyperlipidemia  5.  Reactive depression    Comment: Doing well with no recurrent symptoms.  His carotid dissection seems to have extended slightly but the thrombus seems mostly resolved.  Will continue Xarelto for now given progression.    Plan:  1. Xarelto 20mg.  Discontinue low-dose aspirin  2. Follow up CTA H/N in 3 months then will see me again  3. Lexapro 10mg for depression, increase to 20 mg after 1 month if tolerating  4.  Okay to decrease dose of Lipitor or change back to Zocor if LDL less than 70    I spent 20 of 25 minutes discussing etiology and prognosis of carotid dissection with the patient

## 2019-06-10 DIAGNOSIS — I63.412 CEREBROVASCULAR ACCIDENT (CVA) DUE TO EMBOLISM OF LEFT MIDDLE CEREBRAL ARTERY (HCC): ICD-10-CM

## 2019-06-10 DIAGNOSIS — E78.2 MIXED HYPERLIPIDEMIA: ICD-10-CM

## 2019-06-10 RX ORDER — ATORVASTATIN CALCIUM 80 MG/1
TABLET, FILM COATED ORAL
Qty: 30 TABLET | Refills: 5 | Status: SHIPPED | OUTPATIENT
Start: 2019-06-10

## 2019-08-26 ENCOUNTER — HOSPITAL ENCOUNTER (OUTPATIENT)
Dept: CT IMAGING | Facility: HOSPITAL | Age: 59
Discharge: HOME | End: 2019-08-26
Admitting: PSYCHIATRY & NEUROLOGY

## 2019-08-26 ENCOUNTER — APPOINTMENT (OUTPATIENT)
Dept: CT IMAGING | Facility: HOSPITAL | Age: 59
End: 2019-08-26

## 2019-08-26 DIAGNOSIS — I63.232 CEREBROVASCULAR ACCIDENT (CVA) DUE TO STENOSIS OF LEFT CAROTID ARTERY (HCC): ICD-10-CM

## 2019-08-26 DIAGNOSIS — I77.71 CAROTID ARTERY DISSECTION (HCC): ICD-10-CM

## 2019-08-26 LAB — CREAT BLDA-MCNC: 1 MG/DL (ref 0.6–1.3)

## 2019-08-26 PROCEDURE — 0 IOPAMIDOL PER 1 ML: Performed by: PSYCHIATRY & NEUROLOGY

## 2019-08-26 PROCEDURE — 82565 ASSAY OF CREATININE: CPT

## 2019-08-26 PROCEDURE — 70496 CT ANGIOGRAPHY HEAD: CPT

## 2019-08-26 PROCEDURE — 70498 CT ANGIOGRAPHY NECK: CPT

## 2019-08-26 RX ADMIN — IOPAMIDOL 95 ML: 755 INJECTION, SOLUTION INTRAVENOUS at 13:49

## 2019-08-29 ENCOUNTER — TELEPHONE (OUTPATIENT)
Dept: NEUROLOGY | Facility: CLINIC | Age: 59
End: 2019-08-29

## 2019-08-29 NOTE — TELEPHONE ENCOUNTER
----- Message from Kody Bagley MD sent at 8/28/2019  7:52 AM EDT -----  Based on his most recent CTA (it's stable) we can stop Xarelto. Tell him to take full dose aspirin instead    Thanks  Doug

## 2019-08-29 NOTE — TELEPHONE ENCOUNTER
Informed pt based on most recent CTA, (it's stable) xarelto can be stopped. Instructed patient to take Aspirin 328 mg instead.  Pt expressed understanding and agreement with information.

## 2019-09-11 ENCOUNTER — OFFICE VISIT (OUTPATIENT)
Dept: NEUROLOGY | Facility: CLINIC | Age: 59
End: 2019-09-11

## 2019-09-11 VITALS
WEIGHT: 181 LBS | DIASTOLIC BLOOD PRESSURE: 88 MMHG | SYSTOLIC BLOOD PRESSURE: 148 MMHG | HEART RATE: 72 BPM | BODY MASS INDEX: 25.34 KG/M2 | OXYGEN SATURATION: 98 % | HEIGHT: 71 IN

## 2019-09-11 DIAGNOSIS — I10 ESSENTIAL HYPERTENSION: ICD-10-CM

## 2019-09-11 DIAGNOSIS — I77.71 CAROTID ARTERY DISSECTION (HCC): Primary | ICD-10-CM

## 2019-09-11 DIAGNOSIS — E78.2 MIXED HYPERLIPIDEMIA: ICD-10-CM

## 2019-09-11 PROCEDURE — 99213 OFFICE O/P EST LOW 20 MIN: CPT | Performed by: PSYCHIATRY & NEUROLOGY

## 2019-09-11 RX ORDER — ASPIRIN 325 MG
325 TABLET ORAL DAILY
COMMUNITY

## 2019-09-11 NOTE — PROGRESS NOTES
"DOS: 2019  NAME: Neeraj Frazier   : 1960  PCP: Destini Castillo APRN  Chief Complaint   Patient presents with   • Stroke       Chief complaint: Stroke follow up  Subjective: 5-year-old man with hypertension and high cholesterol following here for left carotid dissection.  No new symptoms in the last few months.  He was started on Lexapro for anxiety but he felt that this just made him fatigued so he stopped it.  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  He is not interested in trying additional medications.  His most repeat CT angiogram of the head and neck on  was stable with persistent left carotid dissection, no thrombus, no worsening.  No new vision loss or right-sided weakness.  He has difficulty with names but otherwise minimal language problems.    Objective:  Vital signs: /88 (BP Location: Left arm, Patient Position: Sitting, Cuff Size: Adult)   Pulse 72   Ht 180.3 cm (71\")   Wt 82.1 kg (181 lb)   SpO2 98%   BMI 25.24 kg/m²    Gen: NAD, vitals reviewed  MS: oriented x3, recent/remote memory intact, normal attention/concentration, language intact, no neglect.  CN: visual acuity grossly normal, left pupil miotic, left ptosis, EOMI, no facial droop, no dysarthria  Gait: Normal station, no ataxia    Review of Systems   Constitutional: Positive for fatigue (stress with sick father and job). Negative for activity change, appetite change and unexpected weight change.   HENT: Negative for facial swelling, trouble swallowing and voice change.    Eyes: Negative for photophobia, pain and visual disturbance.   Respiratory: Negative for chest tightness, shortness of breath and wheezing.    Cardiovascular: Negative for chest pain, palpitations and leg swelling.   Gastrointestinal: Negative for abdominal pain, nausea and " vomiting.   Endocrine: Negative for cold intolerance and heat intolerance.   Musculoskeletal: Negative for arthralgias, back pain, gait problem, joint swelling, myalgias, neck pain and neck stiffness.   Neurological: Negative for dizziness, tremors, seizures, syncope, facial asymmetry, speech difficulty, weakness, light-headedness, numbness and headaches.   Hematological: Does not bruise/bleed easily.   Psychiatric/Behavioral: Negative for agitation, behavioral problems, confusion, decreased concentration, dysphoric mood, hallucinations, self-injury, sleep disturbance and suicidal ideas. The patient is nervous/anxious (anxious). The patient is not hyperactive.            Laboratory results:  Lab Results   Component Value Date    GLUCOSE 105 (H) 04/14/2019    CALCIUM 9.6 04/14/2019     04/14/2019    K 4.5 04/14/2019    CO2 25 04/14/2019     04/14/2019    BUN 13 04/14/2019    CREATININE 1.00 08/26/2019    BCR 14.4 04/14/2019    ANIONGAP 15.5 04/14/2019     Lab Results   Component Value Date    WBC 4.63 04/17/2019    HGB 13.1 04/17/2019    HCT 39.9 04/17/2019    MCV 96.4 04/17/2019     04/17/2019     Lab Results   Component Value Date    LDL 80 04/15/2019     Review and interpretation of imaging: I personally reviewed his follow-up CTA performed 2 weeks ago which shows stable left cervical carotid dissection, no change from prior.  Radiology report reviewed    Workup to date: Left middle cerebral artery stroke due to left carotid dissection with mural thrombus  MRI 4/2019: Patchy left MCA stroke  CTA 4/2019: Left ICA dissection with mural thrombus  CTA 6/2019: Slight extension of left ICA dissection, thrombus seems to have resolved  CTA 8/26/2019: Stable left ICA dissection, no change from prior     Impression:  1.  Left carotid dissection  2.  Left middle cerebral artery stroke, due to thrombus from left carotid dissection  3.  Essential hypertension  4.  Mixed hyperlipidemia    Comment: CTA shows  no change in carotid dissection.  Switch from Xarelto to full dose aspirin.  No further surveillance imaging needed.    Plan:  1.  Continue aspirin 325 for chronic left cervical carotid dissection  2.  Statin for goal LDL less than 70  3.  Monitor blood pressure daily  4.  No follow-up imaging required at this point.  I asked him to call us if he develops any new neurologic symptoms. Call 911 if any sudden onset unilateral weakness, numbness, vision loss, or aphasia    Follow up with neurology as needed.

## 2022-05-17 ENCOUNTER — ON CAMPUS - OUTPATIENT (AMBULATORY)
Dept: URBAN - METROPOLITAN AREA HOSPITAL 2 | Facility: HOSPITAL | Age: 62
End: 2022-05-17
Payer: COMMERCIAL

## 2022-05-17 ENCOUNTER — OFFICE (AMBULATORY)
Dept: URBAN - METROPOLITAN AREA PATHOLOGY 4 | Facility: PATHOLOGY | Age: 62
End: 2022-05-17
Payer: COMMERCIAL

## 2022-05-17 VITALS
DIASTOLIC BLOOD PRESSURE: 81 MMHG | SYSTOLIC BLOOD PRESSURE: 115 MMHG | HEART RATE: 71 BPM | DIASTOLIC BLOOD PRESSURE: 74 MMHG | OXYGEN SATURATION: 99 % | SYSTOLIC BLOOD PRESSURE: 134 MMHG | HEIGHT: 71 IN | HEART RATE: 78 BPM | SYSTOLIC BLOOD PRESSURE: 148 MMHG | SYSTOLIC BLOOD PRESSURE: 123 MMHG | RESPIRATION RATE: 16 BRPM | DIASTOLIC BLOOD PRESSURE: 70 MMHG | HEART RATE: 68 BPM | DIASTOLIC BLOOD PRESSURE: 79 MMHG | HEART RATE: 72 BPM | DIASTOLIC BLOOD PRESSURE: 83 MMHG | DIASTOLIC BLOOD PRESSURE: 76 MMHG | HEART RATE: 74 BPM | SYSTOLIC BLOOD PRESSURE: 119 MMHG | HEART RATE: 70 BPM | WEIGHT: 177 LBS | SYSTOLIC BLOOD PRESSURE: 126 MMHG | OXYGEN SATURATION: 98 % | RESPIRATION RATE: 18 BRPM | TEMPERATURE: 97.8 F | OXYGEN SATURATION: 97 % | SYSTOLIC BLOOD PRESSURE: 137 MMHG

## 2022-05-17 DIAGNOSIS — D12.3 BENIGN NEOPLASM OF TRANSVERSE COLON: ICD-10-CM

## 2022-05-17 DIAGNOSIS — Z86.010 PERSONAL HISTORY OF COLONIC POLYPS: ICD-10-CM

## 2022-05-17 DIAGNOSIS — K64.1 SECOND DEGREE HEMORRHOIDS: ICD-10-CM

## 2022-05-17 PROBLEM — K63.5 POLYP OF COLON: Status: ACTIVE | Noted: 2022-05-17

## 2022-05-17 LAB
GI HISTOLOGY: A. UNSPECIFIED: (no result)
GI HISTOLOGY: PDF REPORT: (no result)

## 2022-05-17 PROCEDURE — 45385 COLONOSCOPY W/LESION REMOVAL: CPT | Mod: 33 | Performed by: INTERNAL MEDICINE

## 2022-05-17 PROCEDURE — 88305 TISSUE EXAM BY PATHOLOGIST: CPT | Performed by: INTERNAL MEDICINE
